# Patient Record
Sex: MALE | Race: WHITE | NOT HISPANIC OR LATINO | Employment: UNEMPLOYED | URBAN - METROPOLITAN AREA
[De-identification: names, ages, dates, MRNs, and addresses within clinical notes are randomized per-mention and may not be internally consistent; named-entity substitution may affect disease eponyms.]

---

## 2020-07-08 PROBLEM — G40.909 SEIZURE DISORDER: Status: ACTIVE | Noted: 2020-07-08

## 2020-07-08 PROBLEM — F32.A DEPRESSION WITH SUICIDAL IDEATION: Status: ACTIVE | Noted: 2020-07-08

## 2020-07-08 PROBLEM — R45.851 DEPRESSION WITH SUICIDAL IDEATION: Status: ACTIVE | Noted: 2020-07-08

## 2020-07-08 PROBLEM — E78.5 HYPERLIPIDEMIA: Status: ACTIVE | Noted: 2020-07-08

## 2020-07-08 PROBLEM — E78.49 OTHER HYPERLIPIDEMIA: Status: ACTIVE | Noted: 2020-07-08

## 2020-07-08 PROBLEM — E87.6 HYPOKALEMIA: Status: ACTIVE | Noted: 2020-07-08

## 2020-07-08 PROBLEM — S06.6XAA SUBARACHNOID HEMATOMA: Status: ACTIVE | Noted: 2018-05-16

## 2020-07-08 PROBLEM — F31.5 BIPOLAR I DISORDER, SEVERE, CURRENT OR MOST RECENT EPISODE DEPRESSED, WITH PSYCHOTIC FEATURES: Status: ACTIVE | Noted: 2020-07-08

## 2020-07-08 PROBLEM — F32.A DEPRESSION: Status: ACTIVE | Noted: 2020-07-08

## 2020-07-08 PROBLEM — F15.222 AMPHETAMINE INTOXICATION WITH PERCEPTUAL DISTURBANCE AND MODERATE TO SEVERE USE DISORDER: Status: ACTIVE | Noted: 2020-07-08

## 2020-07-09 PROBLEM — I10 HYPERTENSION: Chronic | Status: ACTIVE | Noted: 2020-07-09

## 2020-07-09 PROBLEM — R19.7 ACUTE DIARRHEA: Status: ACTIVE | Noted: 2020-07-09

## 2020-07-10 PROBLEM — Z76.89 NEED FOR OCCUPATIONAL THERAPY ASSESSMENT: Status: ACTIVE | Noted: 2020-07-10

## 2020-07-10 PROBLEM — Z01.89 PHYSICAL THERAPY EVALUATION, INITIAL: Status: ACTIVE | Noted: 2020-07-10

## 2020-07-14 PROBLEM — I69.954 HEMIPLEGIA OF LEFT NONDOMINANT SIDE AS LATE EFFECT OF CEREBROVASCULAR DISEASE: Chronic | Status: ACTIVE | Noted: 2020-07-14

## 2020-07-14 PROBLEM — I69.954 HEMIPLEGIA OF LEFT NONDOMINANT SIDE AS LATE EFFECT OF CEREBROVASCULAR DISEASE: Status: ACTIVE | Noted: 2020-07-14

## 2020-07-15 PROBLEM — L85.3 DRY SKIN: Status: ACTIVE | Noted: 2020-07-15

## 2020-07-16 PROBLEM — F32.A DEPRESSION: Status: RESOLVED | Noted: 2020-07-08 | Resolved: 2020-07-16

## 2020-07-16 PROBLEM — L85.3 DRY SKIN: Status: RESOLVED | Noted: 2020-07-15 | Resolved: 2020-07-16

## 2020-07-16 PROBLEM — R19.7 ACUTE DIARRHEA: Status: RESOLVED | Noted: 2020-07-09 | Resolved: 2020-07-16

## 2020-07-16 PROBLEM — F15.222 AMPHETAMINE INTOXICATION WITH PERCEPTUAL DISTURBANCE AND MODERATE TO SEVERE USE DISORDER: Status: RESOLVED | Noted: 2020-07-08 | Resolved: 2020-07-16

## 2020-07-16 PROBLEM — E87.6 HYPOKALEMIA: Status: RESOLVED | Noted: 2020-07-08 | Resolved: 2020-07-16

## 2022-10-11 PROBLEM — Z12.11 ENCOUNTER FOR SCREENING COLONOSCOPY: Status: ACTIVE | Noted: 2022-10-11

## 2023-04-11 ENCOUNTER — HOSPITAL ENCOUNTER (EMERGENCY)
Facility: HOSPITAL | Age: 53
Discharge: HOME | End: 2023-04-11
Attending: EMERGENCY MEDICINE
Payer: MEDICAID

## 2023-04-11 VITALS
OXYGEN SATURATION: 100 % | HEIGHT: 74 IN | WEIGHT: 160 LBS | HEART RATE: 50 BPM | RESPIRATION RATE: 16 BRPM | SYSTOLIC BLOOD PRESSURE: 121 MMHG | DIASTOLIC BLOOD PRESSURE: 89 MMHG | BODY MASS INDEX: 20.53 KG/M2 | TEMPERATURE: 98 F

## 2023-04-11 DIAGNOSIS — G43.909 MIGRAINE WITHOUT STATUS MIGRAINOSUS, NOT INTRACTABLE, UNSPECIFIED MIGRAINE TYPE: Primary | ICD-10-CM

## 2023-04-11 LAB
CREAT SERPL-MCNC: 1 MG/DL (ref 0.5–1.4)
SAMPLE: NORMAL

## 2023-04-11 PROCEDURE — 96375 TX/PRO/DX INJ NEW DRUG ADDON: CPT

## 2023-04-11 PROCEDURE — 99285 EMERGENCY DEPT VISIT HI MDM: CPT | Mod: 25

## 2023-04-11 PROCEDURE — 63600175 PHARM REV CODE 636 W HCPCS: Performed by: NURSE PRACTITIONER

## 2023-04-11 PROCEDURE — 96374 THER/PROPH/DIAG INJ IV PUSH: CPT

## 2023-04-11 PROCEDURE — 96361 HYDRATE IV INFUSION ADD-ON: CPT

## 2023-04-11 PROCEDURE — 25000003 PHARM REV CODE 250: Performed by: NURSE PRACTITIONER

## 2023-04-11 RX ORDER — METHOCARBAMOL 500 MG/1
500 TABLET, FILM COATED ORAL
Status: COMPLETED | OUTPATIENT
Start: 2023-04-11 | End: 2023-04-11

## 2023-04-11 RX ORDER — DIPHENHYDRAMINE HCL 12.5MG/5ML
6.25 ELIXIR ORAL
Status: COMPLETED | OUTPATIENT
Start: 2023-04-11 | End: 2023-04-11

## 2023-04-11 RX ORDER — PROCHLORPERAZINE EDISYLATE 5 MG/ML
10 INJECTION INTRAMUSCULAR; INTRAVENOUS
Status: COMPLETED | OUTPATIENT
Start: 2023-04-11 | End: 2023-04-11

## 2023-04-11 RX ORDER — DICLOFENAC SODIUM 50 MG/1
50 TABLET, DELAYED RELEASE ORAL 3 TIMES DAILY PRN
Qty: 20 TABLET | Refills: 2 | Status: SHIPPED | OUTPATIENT
Start: 2023-04-11

## 2023-04-11 RX ORDER — KETOROLAC TROMETHAMINE 30 MG/ML
15 INJECTION, SOLUTION INTRAMUSCULAR; INTRAVENOUS
Status: COMPLETED | OUTPATIENT
Start: 2023-04-11 | End: 2023-04-11

## 2023-04-11 RX ADMIN — DIPHENHYDRAMINE HYDROCHLORIDE 6.25 MG: 25 SOLUTION ORAL at 01:04

## 2023-04-11 RX ADMIN — SODIUM CHLORIDE 1000 ML: 0.9 INJECTION, SOLUTION INTRAVENOUS at 01:04

## 2023-04-11 RX ADMIN — PROCHLORPERAZINE EDISYLATE 10 MG: 5 INJECTION INTRAMUSCULAR; INTRAVENOUS at 01:04

## 2023-04-11 RX ADMIN — METHOCARBAMOL 500 MG: 500 TABLET ORAL at 03:04

## 2023-04-11 RX ADMIN — KETOROLAC TROMETHAMINE 15 MG: 30 INJECTION, SOLUTION INTRAMUSCULAR; INTRAVENOUS at 01:04

## 2023-04-11 NOTE — ED PROVIDER NOTES
Encounter Date: 2023       History     Chief Complaint   Patient presents with    Headache     Pt states chronic migraines. Onset this episode x 2.5 weeks.     Presents with complaint of headache.  Patient reports history of migraine headaches.  Also reports a history of an aneurysm in .  He has a history of PTSD.  Is currently residing at [facility].  He has an opiate addiction.  He moved here from she reports to go to [facility].  He denies blurred vision or dizziness.  He also reports neck pain.  His neck pain is chronic.  Denies injury.  He has been on Neurontin in the past for his neck pain but has not had any since September of last year.  Has not had any of his medications including his psych medications for bipolar.  Denies fever nausea vomiting or diarrhea.    Review of patient's allergies indicates:  No Known Allergies  Past Medical History:   Diagnosis Date    Aneurysm     Anxiety     Asthma     Bipolar 1 disorder     Coronary artery disease involving coronary bypass graft     Depression     Hyperlipidemia 2020    Hypertension     Kidney stone     PTSD (post-traumatic stress disorder)     Stroke      Past Surgical History:   Procedure Laterality Date    BYPASS OF MESENTERIC ARTERY      CORONARY ARTERY BYPASS GRAFT  2018    2v    FRACTURE SURGERY      REPAIR OF ANEURYSM       Family History   Problem Relation Age of Onset    Cancer Father      Social History     Tobacco Use    Smoking status: Former     Packs/day: 1.00     Years: 34.00     Pack years: 34.00     Types: Cigarettes     Quit date: 11/3/2021     Years since quittin.4    Smokeless tobacco: Never   Substance Use Topics    Alcohol use: Not Currently    Drug use: Not Currently     Review of Systems   Constitutional:  Negative for chills, diaphoresis and fever.   Respiratory:  Negative for cough, chest tightness, shortness of breath and wheezing.    Cardiovascular:  Negative for chest pain, palpitations and  leg swelling.   Gastrointestinal:  Negative for abdominal distention, abdominal pain, diarrhea, nausea and vomiting.   Musculoskeletal:  Positive for neck pain. Negative for back pain and gait problem.   Skin:  Negative for rash.   Neurological:  Positive for headaches. Negative for dizziness, weakness and light-headedness.   Psychiatric/Behavioral:  Negative for agitation and behavioral problems.      Physical Exam     Initial Vitals [04/11/23 1142]   BP Pulse Resp Temp SpO2   (!) 147/86 85 17 98.2 °F (36.8 °C) 97 %      MAP       --         Physical Exam    Constitutional: He appears well-developed and well-nourished.   HENT:   Head: Normocephalic and atraumatic.   Mouth/Throat: Oropharynx is clear and moist.   Eyes: Conjunctivae are normal. Pupils are equal, round, and reactive to light.   Neck: Neck supple.   There is no bony tenderness.  Patient has full range of motion to his cervical spine.   Normal range of motion.  Cardiovascular:  Normal rate, regular rhythm and normal heart sounds.           Pulmonary/Chest: No respiratory distress. He has no wheezes. He has no rhonchi.   Musculoskeletal:      Cervical back: Normal range of motion and neck supple.      Comments: Patient has left-sided weakness to that upper arm it is somewhat contracted.  This is from an old CVA.  He is ambulatory per self.  Moves all other extremities without difficulty.     Neurological: He is alert and oriented to person, place, and time. He has normal strength. No sensory deficit. GCS score is 15. GCS eye subscore is 4. GCS verbal subscore is 5. GCS motor subscore is 6.   Skin: Capillary refill takes less than 2 seconds.   Psychiatric: He has a normal mood and affect. Thought content normal.       ED Course   Procedures  Labs Reviewed   ISTAT CREATININE   POCT CREATININE          Imaging Results              CT Head Without Contrast (Final result)  Result time 04/11/23 12:32:42      Final result by Zach Borrego MD (04/11/23 12:32:42)                    Impression:      1. No acute intracranial abnormality.  2. Unchanged postsurgical changes of right pterional craniotomy and aneurysm clipping in region of right sylvian fissure.  3. Unchanged right frontotemporal lobe encephalomalacia.      Electronically signed by: Zach Borrego MD  Date:    04/11/2023  Time:    12:32               Narrative:      CMS MANDATED QUALITY DATA - CT RADIATION - 436    All CT scans at this facility utilize dose modulation, iterative reconstruction, and/or weight based dosing when appropriate to reduce radiation dose to as low as reasonably achievable.    EXAMINATION:  CT HEAD WITHOUT CONTRAST    CLINICAL HISTORY:  Headache, new or worsening (Age >= 50y);History of a brain aneurysm rupture;    TECHNIQUE:  Head CT without IV contrast.    COMPARISON:  01/13/2022    FINDINGS:  Gray-white differentiation is maintained without hemorrhage, midline shift, or mass effect.    Right pterional craniotomy is present along with 2 aneurysm clips in expected location of the right sylvian fissure.  Right frontal lobe, temporal lobe, insular/extreme capsule and putamen encephalomalacia has not significantly changed.  Focal calcification right frontal lobe unchanged.  Ex vacuo dilation of body of right lateral ventricle is unchanged, also affect in the temporal horn.    The ventricles and cisterns are maintained.    No new calvarial abnormality.  Trace mucosal thickening inferiorly in bilateral maxillary sinuses noted.                                       Medications   sodium chloride 0.9% bolus 1,000 mL 1,000 mL (0 mLs Intravenous Stopped 4/11/23 1434)   ketorolac injection 15 mg (15 mg Intravenous Given 4/11/23 1331)   diphenhydrAMINE 12.5 mg/5 mL elixir 6.25 mg (6.25 mg Oral Given 4/11/23 1321)   prochlorperazine injection Soln 10 mg (10 mg Intravenous Given 4/11/23 1337)   methocarbamoL tablet 500 mg (500 mg Oral Given 4/11/23 1508)     Medical Decision Making:   Initial Assessment:    Presents with complaint of headache.  Denies dizziness or blurred vision.  Patient has a history of migraine headaches.  Patient also has a history of a brain aneurysm.  He also has PTSD.  He has a history of a stroke.  Patient is currently residing at [facility].  He moved from report here after getting a recommendation for rehab from narcotic addiction at the [facility].  Clinical Tests:   Radiological Study: Reviewed  ED Management:  CT of the head reveals nothing acute.  Patient was given a L of normal saline.  Was given Toradol Compazine and Benadryl.  This has totally relieved his headache.  Have given him a Robaxin here while in the ED. this has for the muscle pain in his neck.  He was discharged back to [facility].  Patient has not had any of his medications filled in quite some time.  He has none of them here.  I have given him [facility] is phone number and address.  He is to follow up with them.  At no time while in the ED did this patient appear to be in any acute distress.  At discharge was given return precautions.  He is ambulatory per self.  I have discussed this patient with                         Clinical Impression:   Final diagnoses:  [G43.909] Migraine without status migrainosus, not intractable, unspecified migraine type (Primary)        ED Disposition Condition    Discharge Stable          ED Prescriptions       Medication Sig Dispense Start Date End Date Auth. Provider    diclofenac (VOLTAREN) 50 MG EC tablet Take 1 tablet (50 mg total) by mouth 3 (three) times daily as needed. 20 tablet 4/11/2023 -- Kera Gilils NP          Follow-up Information       Follow up With Specialties Details Why Contact [facility]  In 2 days  [address] [id number]  [phone]               Kera Gillis NP  04/11/23 4990

## 2023-04-11 NOTE — DISCHARGE INSTRUCTIONS
Make a follow-up appointment with [facility].  This is very important.  You need to have your medications refilled and start taking them as directed.  Return to the ED for any worsening of symptoms or any other concerns.

## 2023-05-31 DIAGNOSIS — I63.9 CEREBROVASCULAR ACCIDENT (CVA), UNSPECIFIED MECHANISM: Primary | ICD-10-CM

## 2023-06-21 ENCOUNTER — HOSPITAL ENCOUNTER (OUTPATIENT)
Dept: RADIOLOGY | Facility: HOSPITAL | Age: 53
Discharge: HOME | End: 2023-06-21
Attending: STUDENT IN AN ORGANIZED HEALTH CARE EDUCATION/TRAINING PROGRAM
Payer: MEDICAID

## 2023-06-21 DIAGNOSIS — I63.9 CEREBROVASCULAR ACCIDENT (CVA), UNSPECIFIED MECHANISM: ICD-10-CM

## 2023-06-21 DIAGNOSIS — I63.9 CVA (CEREBRAL VASCULAR ACCIDENT): Primary | ICD-10-CM

## 2023-06-21 PROCEDURE — 70496 CTA HEAD AND NECK (XPD): ICD-10-PCS | Mod: 26,,, | Performed by: RADIOLOGY

## 2023-06-21 PROCEDURE — 70496 CT ANGIOGRAPHY HEAD: CPT | Mod: 26,,, | Performed by: RADIOLOGY

## 2023-06-21 PROCEDURE — 70496 CT ANGIOGRAPHY HEAD: CPT | Mod: TC

## 2023-06-21 PROCEDURE — 25500020 PHARM REV CODE 255

## 2023-06-21 PROCEDURE — 70498 CTA HEAD AND NECK (XPD): ICD-10-PCS | Mod: 26,,, | Performed by: RADIOLOGY

## 2023-06-21 PROCEDURE — 70498 CT ANGIOGRAPHY NECK: CPT | Mod: 26,,, | Performed by: RADIOLOGY

## 2023-06-21 RX ADMIN — IOHEXOL 75 ML: 350 INJECTION, SOLUTION INTRAVENOUS at 09:06

## 2023-06-22 LAB
CREAT SERPL-MCNC: 1 MG/DL (ref 0.5–1.4)
SAMPLE: NORMAL

## 2023-07-20 ENCOUNTER — HOSPITAL ENCOUNTER (OUTPATIENT)
Dept: CARDIOLOGY | Facility: HOSPITAL | Age: 53
Discharge: HOME | End: 2023-07-20
Attending: STUDENT IN AN ORGANIZED HEALTH CARE EDUCATION/TRAINING PROGRAM
Payer: MEDICAID

## 2023-07-20 VITALS — WEIGHT: 160 LBS | BODY MASS INDEX: 20.53 KG/M2 | HEIGHT: 74 IN

## 2023-07-20 DIAGNOSIS — I63.9 CEREBROVASCULAR ACCIDENT (CVA), UNSPECIFIED MECHANISM: ICD-10-CM

## 2023-07-20 LAB
AORTIC ROOT ANNULUS: 3.7 CM
AORTIC VALVE CUSP SEPERATION: 2 CM
AV INDEX (PROSTH): 0.86
AV MEAN GRADIENT: 4 MMHG
AV PEAK GRADIENT: 7 MMHG
AV REGURGITATION PRESSURE HALF TIME: 460 MS
AV VALVE AREA: 3.26 CM2
AV VELOCITY RATIO: 0.72
BSA FOR ECHO PROCEDURE: 1.95 M2
CV ECHO LV RWT: 0.82 CM
DOP CALC AO PEAK VEL: 1.32 M/S
DOP CALC AO VTI: 22.5 CM
DOP CALC LVOT AREA: 3.8 CM2
DOP CALC LVOT DIAMETER: 2.2 CM
DOP CALC LVOT PEAK VEL: 0.95 M/S
DOP CALC LVOT STROKE VOLUME: 73.33 CM3
DOP CALCLVOT PEAK VEL VTI: 19.3 CM
E WAVE DECELERATION TIME: 174 MSEC
E/A RATIO: 0.81
E/E' RATIO: 6.42 M/S
ECHO LV POSTERIOR WALL: 1.42 CM (ref 0.6–1.1)
EJECTION FRACTION: 60 %
FRACTIONAL SHORTENING: 31 % (ref 28–44)
INTERVENTRICULAR SEPTUM: 1.23 CM (ref 0.6–1.1)
IVRT: 95 MSEC
LEFT ATRIUM SIZE: 2.9 CM
LEFT INTERNAL DIMENSION IN SYSTOLE: 2.37 CM (ref 2.1–4)
LEFT VENTRICLE DIASTOLIC VOLUME INDEX: 24.8 ML/M2
LEFT VENTRICLE DIASTOLIC VOLUME: 49.1 ML
LEFT VENTRICLE MASS INDEX: 78 G/M2
LEFT VENTRICLE SYSTOLIC VOLUME INDEX: 9.8 ML/M2
LEFT VENTRICLE SYSTOLIC VOLUME: 19.5 ML
LEFT VENTRICULAR INTERNAL DIMENSION IN DIASTOLE: 3.45 CM (ref 3.5–6)
LEFT VENTRICULAR MASS: 155.28 G
LV LATERAL E/E' RATIO: 6.1 M/S
LV SEPTAL E/E' RATIO: 6.78 M/S
LVOT MG: 2 MMHG
LVOT MV: 0.65 CM/S
MV PEAK A VEL: 0.75 M/S
MV PEAK E VEL: 0.61 M/S
MV STENOSIS PRESSURE HALF TIME: 49 MS
MV VALVE AREA P 1/2 METHOD: 4.49 CM2
PISA AR MAX VEL: 2.26 M/S
PISA TR MAX VEL: 2.48 M/S
RA PRESSURE: 3 MMHG
RIGHT VENTRICULAR END-DIASTOLIC DIMENSION: 2.38 CM
TDI LATERAL: 0.1 M/S
TDI SEPTAL: 0.09 M/S
TDI: 0.1 M/S
TR MAX PG: 25 MMHG
TV REST PULMONARY ARTERY PRESSURE: 28 MMHG

## 2023-07-20 PROCEDURE — 93306 TTE W/DOPPLER COMPLETE: CPT | Mod: 26,,, | Performed by: INTERNAL MEDICINE

## 2023-07-20 PROCEDURE — 93306 TTE W/DOPPLER COMPLETE: CPT

## 2023-07-20 PROCEDURE — 93306 ECHO (CUPID ONLY): ICD-10-PCS | Mod: 26,,, | Performed by: INTERNAL MEDICINE

## 2024-01-05 ENCOUNTER — TELEPHONE (OUTPATIENT)
Dept: ADMINISTRATIVE | Facility: HOSPITAL | Age: 54
End: 2024-01-05
Payer: MEDICAID

## 2024-01-05 DIAGNOSIS — Z76.0 MEDICATION REFILL: ICD-10-CM

## 2024-03-14 ENCOUNTER — LAB VISIT (OUTPATIENT)
Dept: LAB | Facility: HOSPITAL | Age: 54
End: 2024-03-14
Attending: NURSE PRACTITIONER
Payer: MEDICAID

## 2024-03-14 ENCOUNTER — TELEPHONE (OUTPATIENT)
Dept: INTERNAL MEDICINE | Facility: CLINIC | Age: 54
End: 2024-03-14
Payer: MEDICAID

## 2024-03-14 ENCOUNTER — OFFICE VISIT (OUTPATIENT)
Dept: INTERNAL MEDICINE | Facility: CLINIC | Age: 54
End: 2024-03-14
Payer: MEDICAID

## 2024-03-14 VITALS
DIASTOLIC BLOOD PRESSURE: 89 MMHG | HEIGHT: 74 IN | SYSTOLIC BLOOD PRESSURE: 135 MMHG | BODY MASS INDEX: 20.53 KG/M2 | TEMPERATURE: 98 F | HEART RATE: 90 BPM | RESPIRATION RATE: 18 BRPM | WEIGHT: 160 LBS

## 2024-03-14 DIAGNOSIS — I25.810 CORONARY ARTERY DISEASE INVOLVING CORONARY BYPASS GRAFT OF NATIVE HEART WITHOUT ANGINA PECTORIS: ICD-10-CM

## 2024-03-14 DIAGNOSIS — Z11.3 SCREEN FOR STD (SEXUALLY TRANSMITTED DISEASE): ICD-10-CM

## 2024-03-14 DIAGNOSIS — F31.5 BIPOLAR I DISORDER, SEVERE, CURRENT OR MOST RECENT EPISODE DEPRESSED, WITH PSYCHOTIC FEATURES: ICD-10-CM

## 2024-03-14 DIAGNOSIS — R39.198 DIFFICULTY IN URINATION: ICD-10-CM

## 2024-03-14 DIAGNOSIS — I10 PRIMARY HYPERTENSION: Chronic | ICD-10-CM

## 2024-03-14 DIAGNOSIS — E78.49 OTHER HYPERLIPIDEMIA: ICD-10-CM

## 2024-03-14 DIAGNOSIS — Z86.79 HISTORY OF CEREBRAL ANEURYSM: ICD-10-CM

## 2024-03-14 DIAGNOSIS — R51.9 CHRONIC NONINTRACTABLE HEADACHE, UNSPECIFIED HEADACHE TYPE: ICD-10-CM

## 2024-03-14 DIAGNOSIS — G40.909 SEIZURE DISORDER: ICD-10-CM

## 2024-03-14 DIAGNOSIS — G89.29 CHRONIC NONINTRACTABLE HEADACHE, UNSPECIFIED HEADACHE TYPE: ICD-10-CM

## 2024-03-14 DIAGNOSIS — Z76.0 MEDICATION REFILL: ICD-10-CM

## 2024-03-14 DIAGNOSIS — Z00.00 WELLNESS EXAMINATION: ICD-10-CM

## 2024-03-14 DIAGNOSIS — I69.954 SPASTIC HEMIPLEGIA OF LEFT NONDOMINANT SIDE AS LATE EFFECT OF CEREBROVASCULAR DISEASE, UNSPECIFIED CEREBROVASCULAR DISEASE TYPE: Chronic | ICD-10-CM

## 2024-03-14 LAB
ALBUMIN SERPL-MCNC: 4.4 G/DL (ref 3.5–5)
ALBUMIN/GLOB SERPL: 1.4 RATIO (ref 1.1–2)
ALP SERPL-CCNC: 100 UNIT/L (ref 40–150)
ALT SERPL-CCNC: 14 UNIT/L (ref 0–55)
AMPHET UR QL SCN: NEGATIVE
APPEARANCE UR: CLEAR
AST SERPL-CCNC: 12 UNIT/L (ref 5–34)
BACTERIA #/AREA URNS AUTO: ABNORMAL /HPF
BARBITURATE SCN PRESENT UR: NEGATIVE
BASOPHILS # BLD AUTO: 0.05 X10(3)/MCL
BASOPHILS NFR BLD AUTO: 0.7 %
BENZODIAZ UR QL SCN: NEGATIVE
BILIRUB SERPL-MCNC: 0.9 MG/DL
BILIRUB UR QL STRIP.AUTO: NEGATIVE
BUN SERPL-MCNC: 8.9 MG/DL (ref 8.4–25.7)
CALCIUM SERPL-MCNC: 9.6 MG/DL (ref 8.4–10.2)
CANNABINOIDS UR QL SCN: NEGATIVE
CHLORIDE SERPL-SCNC: 109 MMOL/L (ref 98–107)
CHOLEST SERPL-MCNC: 140 MG/DL
CHOLEST/HDLC SERPL: 3 {RATIO} (ref 0–5)
CO2 SERPL-SCNC: 25 MMOL/L (ref 22–29)
COCAINE UR QL SCN: NEGATIVE
COLOR UR AUTO: ABNORMAL
CREAT SERPL-MCNC: 0.9 MG/DL (ref 0.73–1.18)
DEPRECATED CALCIDIOL+CALCIFEROL SERPL-MC: 27.6 NG/ML (ref 30–80)
EOSINOPHIL # BLD AUTO: 0.23 X10(3)/MCL (ref 0–0.9)
EOSINOPHIL NFR BLD AUTO: 3.3 %
ERYTHROCYTE [DISTWIDTH] IN BLOOD BY AUTOMATED COUNT: 12.1 % (ref 11.5–17)
EST. AVERAGE GLUCOSE BLD GHB EST-MCNC: 93.9 MG/DL
FENTANYL UR QL SCN: NEGATIVE
GFR SERPLBLD CREATININE-BSD FMLA CKD-EPI: >60 MLS/MIN/1.73/M2
GLOBULIN SER-MCNC: 3.2 GM/DL (ref 2.4–3.5)
GLUCOSE SERPL-MCNC: 84 MG/DL (ref 74–100)
GLUCOSE UR QL STRIP.AUTO: NORMAL
HAV IGM SERPL QL IA: NONREACTIVE
HBA1C MFR BLD: 4.9 %
HBV CORE IGM SERPL QL IA: NONREACTIVE
HBV SURFACE AG SERPL QL IA: NONREACTIVE
HCT VFR BLD AUTO: 47.4 % (ref 42–52)
HCV AB SERPL QL IA: NONREACTIVE
HDLC SERPL-MCNC: 43 MG/DL (ref 35–60)
HGB BLD-MCNC: 16.1 G/DL (ref 14–18)
HIV 1+2 AB+HIV1 P24 AG SERPL QL IA: NONREACTIVE
HYALINE CASTS #/AREA URNS LPF: ABNORMAL /LPF
IMM GRANULOCYTES # BLD AUTO: 0.02 X10(3)/MCL (ref 0–0.04)
IMM GRANULOCYTES NFR BLD AUTO: 0.3 %
KETONES UR QL STRIP.AUTO: NEGATIVE
LDLC SERPL CALC-MCNC: 76 MG/DL (ref 50–140)
LEUKOCYTE ESTERASE UR QL STRIP.AUTO: NEGATIVE
LYMPHOCYTES # BLD AUTO: 1.05 X10(3)/MCL (ref 0.6–4.6)
LYMPHOCYTES NFR BLD AUTO: 15.3 %
MCH RBC QN AUTO: 31.9 PG (ref 27–31)
MCHC RBC AUTO-ENTMCNC: 34 G/DL (ref 33–36)
MCV RBC AUTO: 94 FL (ref 80–94)
MDMA UR QL SCN: NEGATIVE
MONOCYTES # BLD AUTO: 0.47 X10(3)/MCL (ref 0.1–1.3)
MONOCYTES NFR BLD AUTO: 6.8 %
MUCOUS THREADS URNS QL MICRO: ABNORMAL /LPF
NEUTROPHILS # BLD AUTO: 5.05 X10(3)/MCL (ref 2.1–9.2)
NEUTROPHILS NFR BLD AUTO: 73.6 %
NITRITE UR QL STRIP.AUTO: NEGATIVE
NRBC BLD AUTO-RTO: 0 %
OPIATES UR QL SCN: NEGATIVE
PCP UR QL: NEGATIVE
PH UR STRIP.AUTO: 6 [PH]
PH UR: 6 [PH] (ref 3–11)
PLATELET # BLD AUTO: 314 X10(3)/MCL (ref 130–400)
PMV BLD AUTO: 9.1 FL (ref 7.4–10.4)
POTASSIUM SERPL-SCNC: 4 MMOL/L (ref 3.5–5.1)
PROT SERPL-MCNC: 7.6 GM/DL (ref 6.4–8.3)
PROT UR QL STRIP.AUTO: NEGATIVE
PSA SERPL-MCNC: 0.83 NG/ML
RBC # BLD AUTO: 5.04 X10(6)/MCL (ref 4.7–6.1)
RBC #/AREA URNS AUTO: ABNORMAL /HPF
RBC UR QL AUTO: NEGATIVE
SODIUM SERPL-SCNC: 141 MMOL/L (ref 136–145)
SP GR UR STRIP.AUTO: 1.01 (ref 1–1.03)
SPECIFIC GRAVITY, URINE AUTO (.000) (OHS): 1.01 (ref 1–1.03)
SQUAMOUS #/AREA URNS LPF: ABNORMAL /HPF
T PALLIDUM AB SER QL: NONREACTIVE
TRIGL SERPL-MCNC: 107 MG/DL (ref 34–140)
TSH SERPL-ACNC: 1.06 UIU/ML (ref 0.35–4.94)
UROBILINOGEN UR STRIP-ACNC: NORMAL
VLDLC SERPL CALC-MCNC: 21 MG/DL
WBC # SPEC AUTO: 6.87 X10(3)/MCL (ref 4.5–11.5)
WBC #/AREA URNS AUTO: ABNORMAL /HPF

## 2024-03-14 PROCEDURE — 36415 COLL VENOUS BLD VENIPUNCTURE: CPT

## 2024-03-14 PROCEDURE — 3008F BODY MASS INDEX DOCD: CPT | Mod: CPTII,,, | Performed by: NURSE PRACTITIONER

## 2024-03-14 PROCEDURE — 3075F SYST BP GE 130 - 139MM HG: CPT | Mod: CPTII,,, | Performed by: NURSE PRACTITIONER

## 2024-03-14 PROCEDURE — 3044F HG A1C LEVEL LT 7.0%: CPT | Mod: CPTII,,, | Performed by: NURSE PRACTITIONER

## 2024-03-14 PROCEDURE — 86780 TREPONEMA PALLIDUM: CPT

## 2024-03-14 PROCEDURE — 4010F ACE/ARB THERAPY RXD/TAKEN: CPT | Mod: CPTII,,, | Performed by: NURSE PRACTITIONER

## 2024-03-14 PROCEDURE — 87389 HIV-1 AG W/HIV-1&-2 AB AG IA: CPT

## 2024-03-14 PROCEDURE — 1159F MED LIST DOCD IN RCRD: CPT | Mod: CPTII,,, | Performed by: NURSE PRACTITIONER

## 2024-03-14 PROCEDURE — 1160F RVW MEDS BY RX/DR IN RCRD: CPT | Mod: CPTII,,, | Performed by: NURSE PRACTITIONER

## 2024-03-14 PROCEDURE — 84443 ASSAY THYROID STIM HORMONE: CPT

## 2024-03-14 PROCEDURE — 99204 OFFICE O/P NEW MOD 45 MIN: CPT | Mod: S$PBB,,, | Performed by: NURSE PRACTITIONER

## 2024-03-14 PROCEDURE — 99215 OFFICE O/P EST HI 40 MIN: CPT | Mod: PBBFAC | Performed by: NURSE PRACTITIONER

## 2024-03-14 PROCEDURE — 85025 COMPLETE CBC W/AUTO DIFF WBC: CPT

## 2024-03-14 PROCEDURE — 81015 MICROSCOPIC EXAM OF URINE: CPT

## 2024-03-14 PROCEDURE — 3079F DIAST BP 80-89 MM HG: CPT | Mod: CPTII,,, | Performed by: NURSE PRACTITIONER

## 2024-03-14 PROCEDURE — 82306 VITAMIN D 25 HYDROXY: CPT

## 2024-03-14 PROCEDURE — 83036 HEMOGLOBIN GLYCOSYLATED A1C: CPT

## 2024-03-14 PROCEDURE — 80074 ACUTE HEPATITIS PANEL: CPT

## 2024-03-14 PROCEDURE — 84153 ASSAY OF PSA TOTAL: CPT

## 2024-03-14 PROCEDURE — 80307 DRUG TEST PRSMV CHEM ANLYZR: CPT

## 2024-03-14 PROCEDURE — 80061 LIPID PANEL: CPT

## 2024-03-14 PROCEDURE — 80053 COMPREHEN METABOLIC PANEL: CPT

## 2024-03-14 RX ORDER — TAMSULOSIN HYDROCHLORIDE 0.4 MG/1
1 CAPSULE ORAL DAILY
Qty: 30 CAPSULE | Refills: 3 | Status: SHIPPED | OUTPATIENT
Start: 2024-03-14 | End: 2024-04-16

## 2024-03-14 RX ORDER — LISINOPRIL 2.5 MG/1
1 TABLET ORAL DAILY
COMMUNITY
Start: 2023-09-16 | End: 2024-04-16

## 2024-03-14 RX ORDER — PANTOPRAZOLE SODIUM 40 MG/1
40 TABLET, DELAYED RELEASE ORAL DAILY
Qty: 30 TABLET | Refills: 2 | Status: SHIPPED | OUTPATIENT
Start: 2024-03-14 | End: 2024-06-03
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
## 2024-03-15 NOTE — TELEPHONE ENCOUNTER
Called and spoke to Susie at Bonilla ARELLANO MD 's office in [place] . She provided me with fax number  [phone]. Faxed request for pt's medical records and received successful confirmation.Scanned successful confirmation into media.

## 2024-03-26 ENCOUNTER — OFFICE VISIT (OUTPATIENT)
Dept: CARDIOLOGY | Facility: CLINIC | Age: 54
End: 2024-03-26
Payer: MEDICAID

## 2024-03-26 ENCOUNTER — PROCEDURE VISIT (OUTPATIENT)
Dept: NEUROLOGY | Facility: HOSPITAL | Age: 54
End: 2024-03-26
Attending: NURSE PRACTITIONER
Payer: MEDICAID

## 2024-03-26 VITALS
SYSTOLIC BLOOD PRESSURE: 128 MMHG | OXYGEN SATURATION: 97 % | WEIGHT: 159.19 LBS | DIASTOLIC BLOOD PRESSURE: 86 MMHG | BODY MASS INDEX: 20.43 KG/M2 | RESPIRATION RATE: 20 BRPM | HEART RATE: 70 BPM | HEIGHT: 74 IN | TEMPERATURE: 99 F

## 2024-03-26 DIAGNOSIS — I10 PRIMARY HYPERTENSION: Chronic | ICD-10-CM

## 2024-03-26 DIAGNOSIS — E78.49 OTHER HYPERLIPIDEMIA: ICD-10-CM

## 2024-03-26 DIAGNOSIS — G40.909 SEIZURE DISORDER: ICD-10-CM

## 2024-03-26 DIAGNOSIS — Z76.0 MEDICATION REFILL: ICD-10-CM

## 2024-03-26 DIAGNOSIS — I25.810 CORONARY ARTERY DISEASE INVOLVING CORONARY BYPASS GRAFT OF NATIVE HEART WITHOUT ANGINA PECTORIS: Primary | ICD-10-CM

## 2024-03-26 DIAGNOSIS — I20.89 STABLE ANGINA PECTORIS: ICD-10-CM

## 2024-03-26 DIAGNOSIS — S06.6X9S: ICD-10-CM

## 2024-03-26 DIAGNOSIS — I69.954 SPASTIC HEMIPLEGIA OF LEFT NONDOMINANT SIDE AS LATE EFFECT OF CEREBROVASCULAR DISEASE, UNSPECIFIED CEREBROVASCULAR DISEASE TYPE: Chronic | ICD-10-CM

## 2024-03-26 PROCEDURE — 95816 EEG AWAKE AND DROWSY: CPT | Mod: 26,,, | Performed by: PSYCHIATRY & NEUROLOGY

## 2024-03-26 PROCEDURE — 95816 EEG AWAKE AND DROWSY: CPT

## 2024-03-26 PROCEDURE — 99215 OFFICE O/P EST HI 40 MIN: CPT | Mod: PBBFAC,25 | Performed by: INTERNAL MEDICINE

## 2024-03-26 RX ORDER — NAPROXEN SODIUM 220 MG/1
81 TABLET, FILM COATED ORAL DAILY
Qty: 90 TABLET | Refills: 3 | Status: SHIPPED | OUTPATIENT
Start: 2024-03-26

## 2024-03-26 RX ORDER — NITROGLYCERIN 0.4 MG/1
0.4 TABLET SUBLINGUAL EVERY 5 MIN PRN
Qty: 30 TABLET | Refills: 2 | Status: SHIPPED | OUTPATIENT
Start: 2024-03-26 | End: 2024-06-17 | Stop reason: SDUPTHER

## 2024-03-26 RX ORDER — METOPROLOL SUCCINATE 25 MG/1
25 TABLET, EXTENDED RELEASE ORAL DAILY
Qty: 90 TABLET | Refills: 3 | Status: SHIPPED | OUTPATIENT
Start: 2024-03-26

## 2024-03-26 RX ORDER — ATORVASTATIN CALCIUM 40 MG/1
40 TABLET, FILM COATED ORAL DAILY
Qty: 90 TABLET | Refills: 3 | Status: SHIPPED | OUTPATIENT
Start: 2024-03-26

## 2024-03-26 NOTE — PROGRESS NOTES
"ADDENDUM 4/16/2024  Records obtained from [facility] in [place], [place]. 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  So pt has NO history of CABG.      Cardiology attending addendum  Patient was seen and examined with Dallas Arroyo, MS4. Agree with plan as outlined below. Pt had a minimally invasive CABG x2 in 2018 in [place], hx of subarachnoid hemorrhage due to ruptured aneurysm s/p clipping who is here to establish cardiovascular care.  For the past 2-3 years he has been complaining of exertional chest pain when he "pushes himself".  He also reports chest pain that wakes him up from sleep at night but thinks this could be related to seizures he has not been taking any medications.  Will further evaluate his chest pain with a pharmacological nuclear stress test (patient with hemiplegia).  Will restart aspirin 81 mg, Lipitor 40 mg, metoprolol 25 mg and will prescribe him sublingual nitroglycerin.  Echocardiogram which I have personally reviewed reveals preserved EF and no significant abnormalities otherwise.    Karen Patterson MD  Cardiology staff-[facility]    "

## 2024-03-26 NOTE — PROGRESS NOTES
03/26/2024 8:23 AM    Subjective:     CHIEF COMPLAINT:   Chief Complaint   Patient presents with    initial visit has of CABGX2 2018 in [facility] denies chest                            HPI:    Mr. Marvin Leslie is a 53 y.o. male PMHx CAD (CABG x2 2018), CVA (2016), brain aneurysm (with clipping), HTN, HLD presents as referral for chest pain in the setting of known CAD. Patient reports that he feels overall ok, but each day is different due to his weakness and chest pains. He reports that for the past 2-3 years, he has had pressure-like substernal chest pain that occurs at rest and with exertion and typically lasts 5-10 minutes. This occurs nearly daily and is provoked by daily activities such as housework or walking >1mile. Patient's chest pain is associated with shortness of breath, palpitations, and lightheadedness as well. He does not take nitroglycerin. Patient is not taking BP meds or aspirin or plavix currently. Checks BP at home and will occasionally run in 140s SBP. Also reports mild LLE swelling.     Past Medical History    Patient Active Problem List   Diagnosis    Bipolar I disorder, severe, current or most recent episode depressed, with psychotic features    Seizure disorder    Subarachnoid hematoma    Coronary artery disease involving coronary bypass graft    Other hyperlipidemia    Hypertension    Physical therapy evaluation, initial    Need for occupational therapy assessment    Hemiplegia of left nondominant side as late effect of cerebrovascular disease    Encounter for screening colonoscopy       Surgical History    Past Surgical History:   Procedure Laterality Date    BYPASS OF MESENTERIC ARTERY      CORONARY ARTERY BYPASS GRAFT  12/24/2018    2v    FRACTURE SURGERY      REPAIR OF ANEURYSM      (2.)2016 and 2018       Social History    Social History     Socioeconomic History    Marital status:     Number of children: 0   Tobacco Use    Smoking status: Former     Current packs/day:  0.00     Average packs/day: 1 pack/day for 34.0 years (34.0 ttl pk-yrs)     Types: Cigarettes     Start date: 11/3/1987     Quit date: 11/3/2021     Years since quittin.3    Smokeless tobacco: Never   Substance and Sexual Activity    Alcohol use: Not Currently    Drug use: Not Currently     Comment: past     Social Determinants of Health     Financial Resource Strain: Patient Declined (3/14/2024)    Overall Financial Resource Strain (CARDIA)     Difficulty of Paying Living Expenses: Patient declined   Food Insecurity: Patient Declined (3/14/2024)    Hunger Vital Sign     Worried About Running Out of Food in the Last Year: Patient declined     Ran Out of Food in the Last Year: Patient declined   Transportation Needs: Patient Declined (3/14/2024)    PRAPARE - Transportation     Lack of Transportation (Medical): Patient declined     Lack of Transportation (Non-Medical): Patient declined   Physical Activity: Patient Declined (3/14/2024)    Exercise Vital Sign     Days of Exercise per Week: Patient declined     Minutes of Exercise per Session: Patient declined   Stress: Patient Declined (3/14/2024)    [employer] - Occupational Stress Questionnaire     Feeling of Stress : Patient declined   Social Connections: Patient Declined (3/14/2024)    Social Connection and Isolation Panel [NHANES]     Frequency of Communication with Friends and Family: Patient declined     Frequency of Social Gatherings with Friends and Family: Patient declined     Attends [employer] Services: Patient declined     Active Member of Clubs or Organizations: Patient declined     Attends Club or Organization Meetings: Patient declined     Marital Status: Patient declined   Housing Stability: Patient Declined (3/14/2024)    Housing Stability Vital Sign     Unable to Pay for Housing in the Last Year: Patient declined     Unstable Housing in the Last Year: Patient declined       Family History    Family History   Problem  Relation Age of Onset    Cancer Father        Allergy  Review of patient's allergies indicates:  No Known Allergies    Current Medications    Current Outpatient Medications:     pantoprazole (PROTONIX) 40 MG tablet, Take 1 tablet by mouth once daily, Disp: 30 tablet, Rfl: 2    tamsulosin (FLOMAX) 0.4 mg Cap, Take 1 capsule by mouth once daily., Disp: 30 capsule, Rfl: 3    albuterol (PROVENTIL HFA) 90 mcg/actuation inhaler, Inhale 2 puffs into the lungs every 6 (six) hours as needed for wheezing. Rescue, Disp: 8.5 g, Rfl: 14    ARIPiprazole (ABILIFY) 20 MG Tab, Take 1 tablet (20 mg total) by mouth once daily. (Patient not taking: Reported on 3/14/2024), Disp: 90 tablet, Rfl: 3    aspirin 81 MG Chew, Chew and swallow 1 tablet by mouth once daily. (Patient not taking: Reported on 3/14/2024), Disp: 90 tablet, Rfl: 3    atorvastatin (LIPITOR) 20 MG tablet, Take 1 tablet (20 mg total) by mouth once daily. (Patient not taking: Reported on 3/14/2024), Disp: 90 tablet, Rfl: 3    diclofenac (VOLTAREN) 50 MG EC tablet, Take 1 tablet (50 mg total) by mouth 3 (three) times daily as needed. (Patient not taking: Reported on 3/14/2024), Disp: 20 tablet, Rfl: 2    gabapentin (NEURONTIN) 300 MG capsule, Take 2 capsules by mouth 3 (three) times daily. (Patient not taking: Reported on 3/14/2024), Disp: 540 capsule, Rfl: 3    HYDROXYZINE HCL ORAL, Take by mouth., Disp: , Rfl:     hydrOXYzine pamoate (VISTARIL) 25 MG Cap, Take 1 capsule (25 mg total) by mouth 3 (three) times daily. (Patient not taking: Reported on 3/14/2024), Disp: 90 capsule, Rfl: 11    lisinopriL (PRINIVIL,ZESTRIL) 2.5 MG tablet, Take 1 tablet by mouth once daily., Disp: , Rfl:     methocarbamoL (ROBAXIN) 500 MG Tab, Take 1 tablet (500 mg total) by mouth 3 (three) times daily. (Patient not taking: Reported on 3/14/2024), Disp: 90 tablet, Rfl: 11    metoprolol succinate (TOPROL-XL) 25 MG 24 hr tablet, Take 1 tablet by mouth once daily. (Patient not taking: Reported on  "3/14/2024), Disp: 90 tablet, Rfl: 3    ondansetron (ZOFRAN) 4 MG tablet, Take 1 tablet (4 mg total) by mouth every 8 (eight) hours as needed. (Patient not taking: Reported on 3/14/2024), Disp: 30 tablet, Rfl: 11    prazosin (MINIPRESS) 1 MG Cap, Take 1 capsule (1 mg total) by mouth nightly. (Patient not taking: Reported on 3/14/2024), Disp: 90 capsule, Rfl: 3    sertraline (ZOLOFT) 100 MG tablet, Take 1 tablet by mouth once daily. (Patient not taking: Reported on 3/14/2024), Disp: 90 tablet, Rfl: 3    topiramate (TOPAMAX) 100 MG tablet, Take 1 tablet by mouth 2 (two) times daily., Disp: 180 tablet, Rfl: 3    traZODone (DESYREL) 100 MG tablet, Take 1 tablet (100 mg total) by mouth nightly. (Patient not taking: Reported on 3/14/2024), Disp: 90 tablet, Rfl: 3    VITAMIN D2 1,250 mcg (50,000 unit) capsule, , Disp: , Rfl:     ROS:   Please see HPI                                                                                                                                                                            CONSTITUTIONAL:    No fever.  No night sweats.  RESPIRATORY:  No cough.  No hemoptysis.  No wheezing.  SKIN:  No poor wound healing.  No rash.  CARDIOVASCULAR:  No claudication.  No cyanosis.     HEMATOLOGY:   No adenopathy.  No easy bruising.   MUSCULOSKELETAL:  No muscle cramp.  No muscle weakness.  GASTROENTEROLOGY:  No heart burn.  No melena.    NEUROLOGIC:  No dizziness.  No generalized weakness.     Objective:     PE:  Blood pressure 128/86, pulse 70, temperature 98.6 °F (37 °C), temperature source Oral, resp. rate 20, height 6' 2" (1.88 m), weight 72.2 kg (159 lb 3.2 oz), SpO2 97 %.   BP Readings from Last 3 Encounters:   03/26/24 128/86   03/14/24 135/89   04/11/23 121/89       Wt Readings from Last 3 Encounters:   03/26/24 72.2 kg (159 lb 3.2 oz)   03/14/24 72.6 kg (160 lb)   07/20/23 72.6 kg (160 lb)     BMI 20.44 kg/m^2    GENERAL:   NAD  CONSTITUTIONAL:  No acute distress.  Not ill " appearing.  NECK:  No cervical adenopathy.  No carotid bruit.  CARDIOVASCULAR:  Normal rate.  Regular rhythm.  No murmur.  PULMONARY:  No respiratory distress.  No wheezing.  No crackles.  ABDOMINAL:  No distention.  No tenderness.  MUSCULOSKELETAL:  No deformity.  No edema.  SKIN:  No bruising.  No rash.  NEUROLOGICAL:  Oriented x 3.  No weakness.                                                                                                                                                                                                                                                                                                                                                                                                                                                                               CARDIAC TESTING:  EKG  No results found for this or any previous visit.  Echocardiogram  Results for orders placed during the hospital encounter of 07/20/23    Echo    Interpretation Summary  · The left ventricle is normal in size with mild concentric hypertrophy and normal systolic function.  · Normal left ventricular diastolic function.  · The estimated ejection fraction is 60%.  · Normal right ventricular size with normal right ventricular systolic function.  · Normal central venous pressure (3 mmHg).  · The estimated PA systolic pressure is 28 mmHg.    Stress Test  No results found for this or any previous visit.     Coronary Angiogram  No results found for this or any previous visit.       Last BMP  BMP  Lab Results   Component Value Date     03/14/2024    K 4.0 03/14/2024     07/01/2021    CO2 25 03/14/2024    BUN 8.9 03/14/2024    CREATININE 0.90 03/14/2024    CALCIUM 9.6 03/14/2024    ANIONGAP 12 07/01/2021    EGFRNORACEVR >60 03/14/2024     Last CBC     Lab Results   Component Value Date    WBC 6.87 03/14/2024    HGB 16.1 03/14/2024    HCT 47.4 03/14/2024    MCV 94.0 03/14/2024      03/14/2024         Last lipids    Lab Results   Component Value Date    CHOL 140 03/14/2024    CHOL 78 08/12/2023    CHOL 98 (L) 03/29/2021     Lab Results   Component Value Date    HDL 43 03/14/2024    HDL 23 (L) 08/12/2023    HDL 45 03/29/2021     Lab Results   Component Value Date    LDLCALC 45 08/12/2023    LDLCALC 37 (L) 03/29/2021    LDLCALC 72.0 07/08/2020     Lab Results   Component Value Date    TRIG 107 03/14/2024    TRIG 51 08/12/2023    TRIG 81 03/29/2021       Lab Results   Component Value Date    CHOLHDL 3.39 08/12/2023    CHOLHDL 45.9 03/29/2021    CHOLHDL 30.7 07/08/2020       Assessment:   53 year old male with PMHx CAD (CABG x2 2018), CVA (2016), brain aneurysm (with clipping), HTN, HLD has findings concerning for unstable vs stable angina in the setting of previous CABG and non-adherence with anti-platelet therapy; BP is controlled currently, LDL 76    - will schedule stress test  - will start patient on aspirin 81 mg  - will restart lipitor 40 mg  - will restart on Toprol 25 mg  - will give patient nitroglycerin      Dallas Arroyo MS4      Future Appointments   Date Time Provider Department Center   3/26/2024  8:30 AM Karen Patterson MD [facility] CARD [place]    3/26/2024  9:30 AM EEG, [facility] EEG [place]    4/15/2024  3:00 PM Shayna Rosa FNP [facility] INTMED [place]    5/28/2024  1:00 PM Salvador Dennis MD [facility]   10/23/2024  9:00 AM Niurka Calles MD [facility] NEURO [place]

## 2024-03-26 NOTE — PROCEDURES
ROUTINE EEG    Date/Time: 3/26/2024 9:30 AM    Performed by: Niurka Calles MD  Authorized by: Shayna Rosa FNP      Indication: seizure disorder    Clinical Information: This is a 53 year old male with history of right intracranial aneurysm s/p right crani with seizure disorder     Anticonvulsants: Topiramate 100 mg twice a day    Recording Condition: This is a routine electroencephalogram performed in outpatient settings using modulR/Sciona software. Electroencephalogram leads were placed using a 10-20 placement system. The electroencephalogram is monitored in real time by a technologist and is of good technical quality for review.     Technique: Electroencephalogram leads were placed using a 10-20 placement system and electrode impedance was maintained below 10KOhms. Gee and seizure detection computer program was used for digital EEG analysis throughout the monitoring period, to screen the EEG in real time and liseth the data file with pointers to electrographic seizure and interictal discharges. Hyperventilation was not performed and Photic stimulation was not performed.     Description:   Background Symmetry- Mild Asymmetry: intermittent right temporal slowing with increased amplitude   Frequency - Beta and Alpha  Voltage - Normal   Continuity - Continuous  Anterior to Posterior Gradient - Present  Posterior Dominant Rhythm - 10 Hz   Reactivity - Unclear  State Changes - Present without sleep stage N2 transients   Breach Artifact - Present: right frontal.    Cyclic Alternating Pattern of Encephalopathy (CAPE) - Absent  Sporadic Epileptiform Discharges - Present: frequent right frontal spike and wave and polyspike and wave discharges with max at F4.   Rhythmic or Periodic Patterns (RPPs) - Present: one episode of synchronous asymmetric bilateral frontal and temporal triphasic sharply contoured rhythmic delta with maximum amplitude at right temporal T4.     Electroclinical Seizures (ECSz):  Absent  Electroclinical Status Epilepticus (ECSE): Absent  Electrographical Seizures (Esz): Absent    Briefly Potentially Ictal Rhythmic Discharges (BIRDs): Absent  Ictal-Interictal Continuum (IIC): Absent    Conclusion: This is an abnormal routine awake and drowsy EEG with right frontal and right temporal epileptiform discharge and focal dysfunction with superimposing breach artifact.     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 There is however no ongoing seizure activity, nor is there any evidence of status epilepticus in this study. Clinical correlation is advised.

## 2024-04-08 ENCOUNTER — HOSPITAL ENCOUNTER (OUTPATIENT)
Dept: RADIOLOGY | Facility: HOSPITAL | Age: 54
Discharge: HOME | End: 2024-04-08
Attending: INTERNAL MEDICINE
Payer: MEDICAID

## 2024-04-08 ENCOUNTER — HOSPITAL ENCOUNTER (OUTPATIENT)
Dept: CARDIOLOGY | Facility: HOSPITAL | Age: 54
Discharge: HOME | End: 2024-04-08
Attending: INTERNAL MEDICINE
Payer: MEDICAID

## 2024-04-08 VITALS
WEIGHT: 159.19 LBS | SYSTOLIC BLOOD PRESSURE: 109 MMHG | HEIGHT: 74 IN | HEART RATE: 72 BPM | RESPIRATION RATE: 20 BRPM | DIASTOLIC BLOOD PRESSURE: 88 MMHG | BODY MASS INDEX: 20.43 KG/M2

## 2024-04-08 DIAGNOSIS — I20.89 STABLE ANGINA PECTORIS: ICD-10-CM

## 2024-04-08 DIAGNOSIS — I25.810 CORONARY ARTERY DISEASE INVOLVING CORONARY BYPASS GRAFT OF NATIVE HEART WITHOUT ANGINA PECTORIS: ICD-10-CM

## 2024-04-08 LAB
CV STRESS BASE HR: 74 BPM
DIASTOLIC BLOOD PRESSURE: 88 MMHG
NUC REST EJECTION FRACTION: 62
NUC STRESS EJECTION FRACTION: 73 %
OHS CV CPX 85 PERCENT MAX PREDICTED HEART RATE MALE: 143
OHS CV CPX ESTIMATED METS: 1
OHS CV CPX MAX PREDICTED HEART RATE: 168
OHS CV CPX PATIENT IS FEMALE: 0
OHS CV CPX PATIENT IS MALE: 1
OHS CV CPX PEAK DIASTOLIC BLOOD PRESSURE: 88 MMHG
OHS CV CPX PEAK HEAR RATE: 116 BPM
OHS CV CPX PEAK RATE PRESSURE PRODUCT: NORMAL
OHS CV CPX PEAK SYSTOLIC BLOOD PRESSURE: 109 MMHG
OHS CV CPX PERCENT MAX PREDICTED HEART RATE ACHIEVED: 69
OHS CV CPX RATE PRESSURE PRODUCT PRESENTING: 8066
OHS QRS DURATION: 80 MS
OHS QTC CALCULATION: 447 MS
STRESS ECHO POST EXERCISE DUR MIN: 6 MINUTES
STRESS ECHO POST EXERCISE DUR SEC: 14 SECONDS
SYSTOLIC BLOOD PRESSURE: 109 MMHG

## 2024-04-08 PROCEDURE — 93017 CV STRESS TEST TRACING ONLY: CPT

## 2024-04-08 PROCEDURE — 78452 HT MUSCLE IMAGE SPECT MULT: CPT

## 2024-04-08 PROCEDURE — A9502 TC99M TETROFOSMIN: HCPCS | Performed by: INTERNAL MEDICINE

## 2024-04-08 RX ORDER — REGADENOSON 0.08 MG/ML
0.4 INJECTION, SOLUTION INTRAVENOUS
Status: DISPENSED | OUTPATIENT
Start: 2024-04-08

## 2024-04-08 RX ADMIN — TETROFOSMIN 10.4 MILLICURIE: 1.38 INJECTION, POWDER, LYOPHILIZED, FOR SOLUTION INTRAVENOUS at 09:04

## 2024-04-08 RX ADMIN — TETROFOSMIN 29.6 MILLICURIE: 1.38 INJECTION, POWDER, LYOPHILIZED, FOR SOLUTION INTRAVENOUS at 10:04

## 2024-04-16 ENCOUNTER — OFFICE VISIT (OUTPATIENT)
Dept: INTERNAL MEDICINE | Facility: CLINIC | Age: 54
End: 2024-04-16
Payer: MEDICAID

## 2024-04-16 ENCOUNTER — OFFICE VISIT (OUTPATIENT)
Dept: CARDIOLOGY | Facility: CLINIC | Age: 54
End: 2024-04-16
Payer: MEDICAID

## 2024-04-16 VITALS
TEMPERATURE: 98 F | DIASTOLIC BLOOD PRESSURE: 84 MMHG | HEIGHT: 72 IN | RESPIRATION RATE: 18 BRPM | HEART RATE: 71 BPM | BODY MASS INDEX: 22.37 KG/M2 | WEIGHT: 165.13 LBS | SYSTOLIC BLOOD PRESSURE: 130 MMHG

## 2024-04-16 VITALS
BODY MASS INDEX: 20.71 KG/M2 | RESPIRATION RATE: 20 BRPM | WEIGHT: 161.38 LBS | HEART RATE: 63 BPM | DIASTOLIC BLOOD PRESSURE: 84 MMHG | OXYGEN SATURATION: 98 % | SYSTOLIC BLOOD PRESSURE: 135 MMHG | HEIGHT: 74 IN | TEMPERATURE: 98 F

## 2024-04-16 DIAGNOSIS — E78.49 OTHER HYPERLIPIDEMIA: ICD-10-CM

## 2024-04-16 DIAGNOSIS — I25.810 CORONARY ARTERY DISEASE INVOLVING CORONARY BYPASS GRAFT OF NATIVE HEART WITHOUT ANGINA PECTORIS: Primary | ICD-10-CM

## 2024-04-16 DIAGNOSIS — R06.02 SHORTNESS OF BREATH: ICD-10-CM

## 2024-04-16 DIAGNOSIS — I10 PRIMARY HYPERTENSION: Chronic | ICD-10-CM

## 2024-04-16 DIAGNOSIS — I20.89 STABLE ANGINA PECTORIS: ICD-10-CM

## 2024-04-16 DIAGNOSIS — G43.909 MIGRAINE WITHOUT STATUS MIGRAINOSUS, NOT INTRACTABLE, UNSPECIFIED MIGRAINE TYPE: ICD-10-CM

## 2024-04-16 DIAGNOSIS — E55.9 VITAMIN D DEFICIENCY: ICD-10-CM

## 2024-04-16 DIAGNOSIS — Z87.891 FORMER SMOKER: ICD-10-CM

## 2024-04-16 DIAGNOSIS — I10 PRIMARY HYPERTENSION: ICD-10-CM

## 2024-04-16 PROCEDURE — 3008F BODY MASS INDEX DOCD: CPT | Mod: CPTII,,, | Performed by: NURSE PRACTITIONER

## 2024-04-16 PROCEDURE — 1160F RVW MEDS BY RX/DR IN RCRD: CPT | Mod: CPTII,,, | Performed by: NURSE PRACTITIONER

## 2024-04-16 PROCEDURE — 99214 OFFICE O/P EST MOD 30 MIN: CPT | Mod: S$PBB,,, | Performed by: NURSE PRACTITIONER

## 2024-04-16 PROCEDURE — 3079F DIAST BP 80-89 MM HG: CPT | Mod: CPTII,,, | Performed by: NURSE PRACTITIONER

## 2024-04-16 PROCEDURE — 3075F SYST BP GE 130 - 139MM HG: CPT | Mod: CPTII,,, | Performed by: NURSE PRACTITIONER

## 2024-04-16 PROCEDURE — 3044F HG A1C LEVEL LT 7.0%: CPT | Mod: CPTII,,, | Performed by: NURSE PRACTITIONER

## 2024-04-16 PROCEDURE — 1159F MED LIST DOCD IN RCRD: CPT | Mod: CPTII,,, | Performed by: NURSE PRACTITIONER

## 2024-04-16 PROCEDURE — 99215 OFFICE O/P EST HI 40 MIN: CPT | Mod: PBBFAC,27 | Performed by: INTERNAL MEDICINE

## 2024-04-16 PROCEDURE — 99213 OFFICE O/P EST LOW 20 MIN: CPT | Mod: PBBFAC | Performed by: NURSE PRACTITIONER

## 2024-04-16 RX ORDER — AMITRIPTYLINE HYDROCHLORIDE 25 MG/1
25 TABLET, FILM COATED ORAL NIGHTLY
Qty: 30 TABLET | Refills: 2 | Status: SHIPPED | OUTPATIENT
Start: 2024-04-16 | End: 2024-05-28

## 2024-04-16 RX ORDER — ALBUTEROL SULFATE 90 UG/1
2 AEROSOL, METERED RESPIRATORY (INHALATION) EVERY 6 HOURS PRN
Qty: 8.5 G | Refills: 1 | Status: SHIPPED | OUTPATIENT
Start: 2024-04-16 | End: 2024-05-29

## 2024-04-16 RX ORDER — ASPIRIN 325 MG
50000 TABLET, DELAYED RELEASE (ENTERIC COATED) ORAL
Qty: 12 CAPSULE | Refills: 0 | Status: SHIPPED | OUTPATIENT
Start: 2024-04-16

## 2024-04-16 NOTE — PROGRESS NOTES
04/16/2024 8:23 AM    Subjective:     CHIEF COMPLAINT:   Chief Complaint   Patient presents with    f/u sts has chest pain daily with sob no questions                            HPI:    Mr. Marvin Leslie is a 54 y.o. male PMHx multiple hemorrhagic CVAs and arterial aneurysms s/p clipping and M2 aneurysm bypass using a SVG graft.  The patient previously reported a coronary artery bypass surgery but since last visit records have been obtained from [place], [place] and the bypass of the brain aneurysm is the only major surgery he has had. He does not know if he was ever evaluated for vasculitis.  No history of CAD per outside records.  Currently taking asa, statin, beta blocker.  Prn nitro given at last visit but has not taken any because he thought it was only for absolute emergencies.  Still reports daily episodes of chest tightness associated with shortness of breath and diaphoresis.  Mobility is limited by prior CVA and he is not very active.  Denies lower extremity swelling, orthopnea, palpitations, syncope.    Past Medical History    Patient Active Problem List   Diagnosis    Bipolar I disorder, severe, current or most recent episode depressed, with psychotic features    Seizure disorder    Subarachnoid hematoma    Coronary artery disease involving coronary bypass graft    Other hyperlipidemia    Hypertension    Physical therapy evaluation, initial    Need for occupational therapy assessment    Hemiplegia of left nondominant side as late effect of cerebrovascular disease    Encounter for screening colonoscopy    Stable angina pectoris       Surgical History    Past Surgical History:   Procedure Laterality Date    BYPASS OF MESENTERIC ARTERY      CORONARY ARTERY BYPASS GRAFT  12/24/2018    2v    FRACTURE SURGERY      REPAIR OF ANEURYSM      (2.)2016 and 2018       Social History    Social History     Socioeconomic History    Marital status:     Number of children: 0   Tobacco Use    Smoking status: Former      Current packs/day: 0.00     Average packs/day: 1 pack/day for 34.0 years (34.0 ttl pk-yrs)     Types: Cigarettes     Start date: 11/3/1987     Quit date: 11/3/2021     Years since quittin.4    Smokeless tobacco: Never   Substance and Sexual Activity    Alcohol use: Not Currently    Drug use: Not Currently     Comment: past     Social Determinants of Health     Financial Resource Strain: Patient Declined (3/14/2024)    Overall Financial Resource Strain (CARDIA)     Difficulty of Paying Living Expenses: Patient declined   Food Insecurity: Patient Declined (3/14/2024)    Hunger Vital Sign     Worried About Running Out of Food in the Last Year: Patient declined     Ran Out of Food in the Last Year: Patient declined   Transportation Needs: Patient Declined (3/14/2024)    PRAPARE - Transportation     Lack of Transportation (Medical): Patient declined     Lack of Transportation (Non-Medical): Patient declined   Physical Activity: Patient Declined (3/14/2024)    Exercise Vital Sign     Days of Exercise per Week: Patient declined     Minutes of Exercise per Session: Patient declined   Stress: Patient Declined (3/14/2024)    [employer] [place] [employer] - Occupational Stress Questionnaire     Feeling of Stress : Patient declined   Social Connections: Patient Declined (3/14/2024)    Social Connection and Isolation Panel [NHANES]     Frequency of Communication with Friends and Family: Patient declined     Frequency of Social Gatherings with Friends and Family: Patient declined     Attends [employer] Services: Patient declined     Active Member of Clubs or Organizations: Patient declined     Attends Club or Organization Meetings: Patient declined     Marital Status: Patient declined   Housing Stability: Patient Declined (3/14/2024)    Housing Stability Vital Sign     Unable to Pay for Housing in the Last Year: Patient declined     Unstable Housing in the Last Year: Patient declined       Family History     Family History   Problem Relation Name Age of Onset    Cancer Father         Allergy  Review of patient's allergies indicates:  No Known Allergies    Current Medications    Current Outpatient Medications:     atorvastatin (LIPITOR) 40 MG tablet, Take 1 tablet (40 mg total) by mouth once daily., Disp: 90 tablet, Rfl: 3    metoprolol succinate (TOPROL-XL) 25 MG 24 hr tablet, Take 1 tablet by mouth once daily., Disp: 90 tablet, Rfl: 3    nitroGLYCERIN (NITROSTAT) 0.4 MG SL tablet, Place 1 tablet (0.4 mg total) under the tongue every 5 (five) minutes as needed for Chest pain., Disp: 30 tablet, Rfl: 2    albuterol (PROVENTIL HFA) 90 mcg/actuation inhaler, Inhale 2 puffs into the lungs every 6 (six) hours as needed for wheezing. Rescue (Patient not taking: Reported on 4/16/2024), Disp: 8.5 g, Rfl: 14    ARIPiprazole (ABILIFY) 20 MG Tab, Take 1 tablet (20 mg total) by mouth once daily. (Patient not taking: Reported on 3/14/2024), Disp: 90 tablet, Rfl: 3    aspirin 81 MG Chew, Chew and swallow 1 tablet by mouth once daily. (Patient not taking: Reported on 4/16/2024), Disp: 90 tablet, Rfl: 3    diclofenac (VOLTAREN) 50 MG EC tablet, Take 1 tablet (50 mg total) by mouth 3 (three) times daily as needed. (Patient not taking: Reported on 3/14/2024), Disp: 20 tablet, Rfl: 2    gabapentin (NEURONTIN) 300 MG capsule, Take 2 capsules by mouth 3 (three) times daily. (Patient not taking: Reported on 3/14/2024), Disp: 540 capsule, Rfl: 3    HYDROXYZINE HCL ORAL, Take by mouth. (Patient not taking: Reported on 4/16/2024), Disp: , Rfl:     hydrOXYzine pamoate (VISTARIL) 25 MG Cap, Take 1 capsule (25 mg total) by mouth 3 (three) times daily. (Patient not taking: Reported on 3/14/2024), Disp: 90 capsule, Rfl: 11    lisinopriL (PRINIVIL,ZESTRIL) 2.5 MG tablet, Take 1 tablet by mouth once daily. (Patient not taking: Reported on 4/16/2024), Disp: , Rfl:     methocarbamoL (ROBAXIN) 500 MG Tab, Take 1 tablet (500 mg total) by mouth 3 (three)  times daily. (Patient not taking: Reported on 3/14/2024), Disp: 90 tablet, Rfl: 11    ondansetron (ZOFRAN) 4 MG tablet, Take 1 tablet (4 mg total) by mouth every 8 (eight) hours as needed. (Patient not taking: Reported on 3/14/2024), Disp: 30 tablet, Rfl: 11    pantoprazole (PROTONIX) 40 MG tablet, Take 1 tablet by mouth once daily (Patient not taking: Reported on 4/16/2024), Disp: 30 tablet, Rfl: 2    prazosin (MINIPRESS) 1 MG Cap, Take 1 capsule (1 mg total) by mouth nightly. (Patient not taking: Reported on 3/14/2024), Disp: 90 capsule, Rfl: 3    sertraline (ZOLOFT) 100 MG tablet, Take 1 tablet by mouth once daily. (Patient not taking: Reported on 3/14/2024), Disp: 90 tablet, Rfl: 3    tamsulosin (FLOMAX) 0.4 mg Cap, Take 1 capsule by mouth once daily. (Patient not taking: Reported on 4/16/2024), Disp: 30 capsule, Rfl: 3    topiramate (TOPAMAX) 100 MG tablet, Take 1 tablet by mouth 2 (two) times daily. (Patient not taking: Reported on 4/16/2024), Disp: 180 tablet, Rfl: 3    traZODone (DESYREL) 100 MG tablet, Take 1 tablet (100 mg total) by mouth nightly. (Patient not taking: Reported on 3/14/2024), Disp: 90 tablet, Rfl: 3    VITAMIN D2 1,250 mcg (50,000 unit) capsule, , Disp: , Rfl:     Current Facility-Administered Medications:     regadenoson injection 0.4 mg, 0.4 mg, Intravenous, 1 time in Clinic/HOD, Karen Patterson MD    ROS:   Please see HPI                                                                                                                                                                            CONSTITUTIONAL:    No fever.  No night sweats.  RESPIRATORY:  No cough.  No hemoptysis.  No wheezing.  SKIN:  No poor wound healing.  No rash.  CARDIOVASCULAR:  No claudication.  No cyanosis.     HEMATOLOGY:   No adenopathy.  No easy bruising.   MUSCULOSKELETAL:  No muscle cramp.  No muscle weakness.  GASTROENTEROLOGY:  No heart burn.  No melena.    NEUROLOGIC:  No dizziness.  No generalized  "weakness.     Objective:     PE:  Blood pressure 135/84, pulse 63, temperature 98.1 °F (36.7 °C), temperature source Oral, resp. rate 20, height 6' 2" (1.88 m), weight 73.2 kg (161 lb 6.4 oz), SpO2 98%.   BP Readings from Last 3 Encounters:   04/16/24 135/84   04/08/24 109/88   03/26/24 128/86       Wt Readings from Last 3 Encounters:   04/16/24 73.2 kg (161 lb 6.4 oz)   04/08/24 72.2 kg (159 lb 2.8 oz)   03/26/24 72.2 kg (159 lb 3.2 oz)     BMI 20.72 kg/m^2    GENERAL:   NAD  CONSTITUTIONAL:  No acute distress.  Not ill appearing.  NECK:  No cervical adenopathy.  No carotid bruit.  CARDIOVASCULAR:  Normal rate.  Regular rhythm.  No murmur.  PULMONARY:  No respiratory distress.  No wheezing.  No crackles.  ABDOMINAL:  No distention.  No tenderness.  MUSCULOSKELETAL:  No deformity.  No edema.  SKIN:  No bruising.  No rash.  NEUROLOGICAL:  Oriented x 3.  No weakness.                                                                                                                                                                                                                                                                                                                                                                                                                                                                               CARDIAC TESTING:  EKG  No results found for this or any previous visit.  Echocardiogram  Results for orders placed during the hospital encounter of 07/20/23    Echo    Interpretation Summary  · The left ventricle is normal in size with mild concentric hypertrophy and normal systolic function.  · Normal left ventricular diastolic function.  · The estimated ejection fraction is 60%.  · Normal right ventricular size with normal right ventricular systolic function.  · Normal central venous pressure (3 mmHg).  · The estimated PA systolic pressure is 28 mmHg.    Stress Test 4/8/24    Abnormal myocardial " perfusion scan.    There are two significant perfusion abnormalities.    Perfusion Abnormality #1 - There is a moderate to severe intensity, moderate sized, reversible perfusion abnormality that is consistent with ischemia in the basal to apical lateral apical wall(s) in the typical distribution of the LCX territory.    Perfusion Abnormality #2 - There is a moderate sized, moderate to severe intensity, reversible perfusion abnormality that is consistent with ischemia in the basal to distal inferoseptal wall(s) in the typical distribution of the RCA territory.    There are no other significant perfusion abnormalities.    The gated perfusion images showed an ejection fraction of 62% at rest. The gated perfusion images showed an ejection fraction of 73% post stress.    The patient reported no chest pain during the stress test.    There were no arrhythmias during stress.     Coronary Angiogram  No results found for this or any previous visit.       Last BMP  BMP  Lab Results   Component Value Date     03/14/2024    K 4.0 03/14/2024     07/01/2021    CO2 25 03/14/2024    BUN 8.9 03/14/2024    CREATININE 0.90 03/14/2024    CALCIUM 9.6 03/14/2024    ANIONGAP 12 07/01/2021    EGFRNORACEVR >60 03/14/2024     Last CBC     Lab Results   Component Value Date    WBC 6.87 03/14/2024    HGB 16.1 03/14/2024    HCT 47.4 03/14/2024    MCV 94.0 03/14/2024     03/14/2024         Last lipids    Lab Results   Component Value Date    CHOL 140 03/14/2024    CHOL 78 08/12/2023    CHOL 98 (L) 03/29/2021     Lab Results   Component Value Date    HDL 43 03/14/2024    HDL 23 (L) 08/12/2023    HDL 45 03/29/2021     Lab Results   Component Value Date    LDLCALC 45 08/12/2023    LDLCALC 37 (L) 03/29/2021    LDLCALC 72.0 07/08/2020     Lab Results   Component Value Date    TRIG 107 03/14/2024    TRIG 51 08/12/2023    TRIG 81 03/29/2021       Lab Results   Component Value Date    CHOLHDL 3.39 08/12/2023    CHOLHDL 45.9 03/29/2021     CHOLHDL 30.7 07/08/2020       Assessment:   53 year old male with PMHx of prior polysubstance abuse, multiple hemorrhagic CVAs and ruptures brain aneurysms s/p SVG conduit.    - will proceed with coronary angiogram in the setting on ongoing chest pain, shortness of breath and positive stress test  - aspirin 81 mg, lipitor 40 mg, Toprol 25 mg  - prn nitro for chest pain  - BP acceptable  - LDL 76 - if vascular disease present will need to target goal of <55      Follow up after angiogram    Gerardo Lemus MD PGY5  [employer] [facility] Fellow        Future Appointments   Date Time Provider Department Center   4/16/2024  2:40 PM Shayna Rosa FNP [facility]TM[facility] [place]    5/28/2024  1:00 PM Salvador Dennis MD [facility]   10/23/2024  9:00 AM Niurka Calles MD [facility] NEURO [place]
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
## 2024-04-16 NOTE — PATIENT INSTRUCTIONS
You are scheduled for a Left Heart Cath on 5/22/24    You will have a pre-op visit prior to procedure to review all instructions

## 2024-04-22 ENCOUNTER — HOSPITAL ENCOUNTER (OUTPATIENT)
Dept: PULMONOLOGY | Facility: HOSPITAL | Age: 54
Discharge: HOME | End: 2024-04-22
Attending: NURSE PRACTITIONER
Payer: MEDICAID

## 2024-04-22 DIAGNOSIS — Z87.891 FORMER SMOKER: ICD-10-CM

## 2024-04-22 DIAGNOSIS — R06.02 SHORTNESS OF BREATH: ICD-10-CM

## 2024-04-22 PROCEDURE — 94729 DIFFUSING CAPACITY: CPT

## 2024-04-22 PROCEDURE — 94640 AIRWAY INHALATION TREATMENT: CPT | Mod: XB

## 2024-04-22 PROCEDURE — 94060 EVALUATION OF WHEEZING: CPT

## 2024-04-22 PROCEDURE — 94726 PLETHYSMOGRAPHY LUNG VOLUMES: CPT

## 2024-04-22 RX ORDER — ALBUTEROL SULFATE 2.5 MG/.5ML
2.5 SOLUTION RESPIRATORY (INHALATION) EVERY 4 HOURS PRN
Status: ACTIVE | OUTPATIENT
Start: 2024-04-22

## 2024-04-22 RX ORDER — ALBUTEROL SULFATE 0.83 MG/ML
SOLUTION RESPIRATORY (INHALATION)
Status: DISCONTINUED
Start: 2024-04-22 | End: 2024-04-23 | Stop reason: HOSPADM

## 2024-04-22 RX ADMIN — ALBUTEROL SULFATE 2.5 MG: 2.5 SOLUTION RESPIRATORY (INHALATION) at 10:04

## 2024-04-22 NOTE — PROGRESS NOTES
Good cooperation and effort given. Albuterol 2.5 mg given HR 72/74, SAT 97%, BBS CL. PFT completed

## 2024-04-23 LAB
DLCO SINGLE BREATH LLN: 26.98
DLCO SINGLE BREATH PRE REF: 76.5 %
DLCO SINGLE BREATH REF: 33.9
DLCOC SBVA LLN: 3.2
DLCOC SBVA REF: 4.27
DLCOC SINGLE BREATH LLN: 26.98
DLCOC SINGLE BREATH REF: 33.9
DLCOVA LLN: 3.2
DLCOVA PRE REF: 94.6 %
DLCOVA PRE: 4.04 ML/(MIN*MMHG*L) (ref 3.2–5.34)
DLCOVA REF: 4.27
ERV LLN: -16448.61
ERV PRE REF: 139.5 %
ERV REF: 1.39
FEF 25 75 CHG: -3.1 %
FEF 25 75 LLN: 1.98
FEF 25 75 POST REF: 48.6 %
FEF 25 75 PRE REF: 50.2 %
FEF 25 75 REF: 3.75
FET100 CHG: -1.3 %
FEV1 CHG: -2.7 %
FEV1 FVC CHG: -1.1 %
FEV1 FVC LLN: 67
FEV1 FVC POST REF: 84.9 %
FEV1 FVC PRE REF: 85.8 %
FEV1 FVC REF: 78
FEV1 LLN: 3.36
FEV1 POST REF: 73.7 %
FEV1 PRE REF: 75.8 %
FEV1 REF: 4.34
FRCPLETH LLN: 2.8
FRCPLETH PREREF: 109.1 %
FRCPLETH REF: 3.79
FVC CHG: -1.7 %
FVC LLN: 4.34
FVC POST REF: 86.4 %
FVC PRE REF: 87.9 %
FVC REF: 5.6
IVC PRE: 4.46 L (ref 4.34–6.86)
IVC SINGLE BREATH LLN: 4.34
IVC SINGLE BREATH PRE REF: 79.7 %
IVC SINGLE BREATH REF: 5.6
MVV LLN: 138
MVV PRE REF: 67.7 %
MVV REF: 162
PEF CHG: -5.9 %
PEF LLN: 8.01
PEF POST REF: 75.6 %
PEF PRE REF: 80.4 %
PEF REF: 10.6
POST FEF 25 75: 1.82 L/S (ref 1.98–6.08)
POST FET 100: 14.04 SEC
POST FEV1 FVC: 66.16 % (ref 66.83–87.47)
POST FEV1: 3.2 L (ref 3.36–5.27)
POST FVC: 4.84 L (ref 4.34–6.86)
POST PEF: 8.02 L/S (ref 8.01–13.19)
PRE DLCO: 25.92 ML/(MIN*MMHG) (ref 26.98–40.83)
PRE ERV: 1.94 L (ref -16448.61–16451.39)
PRE FEF 25 75: 1.88 L/S (ref 1.98–6.08)
PRE FET 100: 14.22 SEC
PRE FEV1 FVC: 66.88 % (ref 66.83–87.47)
PRE FEV1: 3.29 L (ref 3.36–5.27)
PRE FRC PL: 4.13 L (ref 2.8–4.77)
PRE FVC: 4.92 L (ref 4.34–6.86)
PRE MVV: 109.75 L/MIN (ref 137.88–186.54)
PRE PEF: 8.52 L/S (ref 8.01–13.19)
PRE RV: 2.19 L (ref 1.72–3.07)
PRE TLC: 7.11 L (ref 6.79–9.09)
RAW LLN: 3.06
RAW PRE REF: 44.3 %
RAW PRE: 1.36 CMH2O*S/L (ref 3.06–3.06)
RAW REF: 3.06
RV LLN: 1.72
RV PRE REF: 91.4 %
RV REF: 2.4
RVTLC LLN: 26
RVTLC PRE REF: 89 %
RVTLC PRE: 30.83 % (ref 25.65–43.61)
RVTLC REF: 35
TLC LLN: 6.79
TLC PRE REF: 89.6 %
TLC REF: 7.94
VA PRE: 6.41 L (ref 7.79–7.79)
VA SINGLE BREATH LLN: 7.79
VA SINGLE BREATH PRE REF: 82.4 %
VA SINGLE BREATH REF: 7.79
VC LLN: 4.34
VC PRE REF: 87.9 %
VC PRE: 4.92 L (ref 4.34–6.86)
VC REF: 5.6

## 2024-04-23 PROCEDURE — 94729 DIFFUSING CAPACITY: CPT | Mod: 26,,, | Performed by: INTERNAL MEDICINE

## 2024-04-23 PROCEDURE — 94726 PLETHYSMOGRAPHY LUNG VOLUMES: CPT | Mod: 26,,, | Performed by: INTERNAL MEDICINE

## 2024-04-23 PROCEDURE — 94060 EVALUATION OF WHEEZING: CPT | Mod: 26,,, | Performed by: INTERNAL MEDICINE

## 2024-05-14 ENCOUNTER — CLINICAL SUPPORT (OUTPATIENT)
Dept: CARDIOLOGY | Facility: CLINIC | Age: 54
End: 2024-05-14
Payer: MEDICAID

## 2024-05-14 VITALS
BODY MASS INDEX: 20.77 KG/M2 | HEART RATE: 82 BPM | RESPIRATION RATE: 18 BRPM | HEIGHT: 74 IN | TEMPERATURE: 98 F | WEIGHT: 161.81 LBS | DIASTOLIC BLOOD PRESSURE: 72 MMHG | OXYGEN SATURATION: 95 % | SYSTOLIC BLOOD PRESSURE: 111 MMHG

## 2024-05-14 DIAGNOSIS — I25.810 CORONARY ARTERY DISEASE INVOLVING CORONARY BYPASS GRAFT OF NATIVE HEART WITHOUT ANGINA PECTORIS: Primary | ICD-10-CM

## 2024-05-14 LAB
ANION GAP SERPL CALC-SCNC: 6 MEQ/L
APTT PPP: 28.9 SECONDS (ref 23.2–33.7)
BASOPHILS # BLD AUTO: 0.07 X10(3)/MCL
BASOPHILS NFR BLD AUTO: 0.9 %
BUN SERPL-MCNC: 10.7 MG/DL (ref 8.4–25.7)
CALCIUM SERPL-MCNC: 9.3 MG/DL (ref 8.4–10.2)
CHLORIDE SERPL-SCNC: 108 MMOL/L (ref 98–107)
CO2 SERPL-SCNC: 26 MMOL/L (ref 22–29)
CREAT SERPL-MCNC: 0.86 MG/DL (ref 0.73–1.18)
CREAT/UREA NIT SERPL: 12
EOSINOPHIL # BLD AUTO: 0.17 X10(3)/MCL (ref 0–0.9)
EOSINOPHIL NFR BLD AUTO: 2.2 %
ERYTHROCYTE [DISTWIDTH] IN BLOOD BY AUTOMATED COUNT: 12 % (ref 11.5–17)
GFR SERPLBLD CREATININE-BSD FMLA CKD-EPI: >60 ML/MIN/1.73/M2
GLUCOSE SERPL-MCNC: 82 MG/DL (ref 74–100)
HCT VFR BLD AUTO: 44.6 % (ref 42–52)
HGB BLD-MCNC: 14.8 G/DL (ref 14–18)
IMM GRANULOCYTES # BLD AUTO: 0.02 X10(3)/MCL (ref 0–0.04)
IMM GRANULOCYTES NFR BLD AUTO: 0.3 %
INR PPP: 1
LYMPHOCYTES # BLD AUTO: 1.16 X10(3)/MCL (ref 0.6–4.6)
LYMPHOCYTES NFR BLD AUTO: 14.7 %
MCH RBC QN AUTO: 31.8 PG (ref 27–31)
MCHC RBC AUTO-ENTMCNC: 33.2 G/DL (ref 33–36)
MCV RBC AUTO: 95.7 FL (ref 80–94)
MONOCYTES # BLD AUTO: 0.46 X10(3)/MCL (ref 0.1–1.3)
MONOCYTES NFR BLD AUTO: 5.8 %
NEUTROPHILS # BLD AUTO: 6 X10(3)/MCL (ref 2.1–9.2)
NEUTROPHILS NFR BLD AUTO: 76.1 %
NRBC BLD AUTO-RTO: 0 %
PLATELET # BLD AUTO: 262 X10(3)/MCL (ref 130–400)
PMV BLD AUTO: 9.6 FL (ref 7.4–10.4)
POTASSIUM SERPL-SCNC: 4.2 MMOL/L (ref 3.5–5.1)
PROTHROMBIN TIME: 13.2 SECONDS (ref 11.4–14)
RBC # BLD AUTO: 4.66 X10(6)/MCL (ref 4.7–6.1)
SODIUM SERPL-SCNC: 140 MMOL/L (ref 136–145)
WBC # SPEC AUTO: 7.88 X10(3)/MCL (ref 4.5–11.5)

## 2024-05-14 PROCEDURE — 93005 ELECTROCARDIOGRAM TRACING: CPT

## 2024-05-14 PROCEDURE — 85025 COMPLETE CBC W/AUTO DIFF WBC: CPT

## 2024-05-14 PROCEDURE — 36415 COLL VENOUS BLD VENIPUNCTURE: CPT

## 2024-05-14 PROCEDURE — 80048 BASIC METABOLIC PNL TOTAL CA: CPT

## 2024-05-14 PROCEDURE — 99213 OFFICE O/P EST LOW 20 MIN: CPT | Mod: PBBFAC,25

## 2024-05-14 PROCEDURE — 85730 THROMBOPLASTIN TIME PARTIAL: CPT

## 2024-05-14 PROCEDURE — 85610 PROTHROMBIN TIME: CPT

## 2024-05-14 RX ORDER — TAMSULOSIN HYDROCHLORIDE 0.4 MG/1
1 CAPSULE ORAL
COMMUNITY
Start: 2024-04-17

## 2024-05-16 LAB
OHS QRS DURATION: 88 MS
OHS QTC CALCULATION: 434 MS

## 2024-05-22 ENCOUNTER — HOSPITAL ENCOUNTER (OUTPATIENT)
Facility: HOSPITAL | Age: 54
Discharge: HOME | End: 2024-05-22
Attending: INTERNAL MEDICINE | Admitting: INTERNAL MEDICINE
Payer: MEDICAID

## 2024-05-22 VITALS
HEART RATE: 57 BPM | DIASTOLIC BLOOD PRESSURE: 82 MMHG | BODY MASS INDEX: 21.43 KG/M2 | SYSTOLIC BLOOD PRESSURE: 126 MMHG | TEMPERATURE: 98 F | OXYGEN SATURATION: 98 % | RESPIRATION RATE: 16 BRPM | WEIGHT: 167 LBS | HEIGHT: 74 IN

## 2024-05-22 DIAGNOSIS — I25.810 CORONARY ARTERY DISEASE INVOLVING CORONARY BYPASS GRAFT OF NATIVE HEART WITHOUT ANGINA PECTORIS: ICD-10-CM

## 2024-05-22 PROCEDURE — 99152 MOD SED SAME PHYS/QHP 5/>YRS: CPT | Performed by: INTERNAL MEDICINE

## 2024-05-22 PROCEDURE — 25500020 PHARM REV CODE 255: Performed by: INTERNAL MEDICINE

## 2024-05-22 PROCEDURE — C1894 INTRO/SHEATH, NON-LASER: HCPCS | Performed by: INTERNAL MEDICINE

## 2024-05-22 PROCEDURE — C1760 CLOSURE DEV, VASC: HCPCS | Performed by: INTERNAL MEDICINE

## 2024-05-22 PROCEDURE — C1769 GUIDE WIRE: HCPCS | Performed by: INTERNAL MEDICINE

## 2024-05-22 PROCEDURE — 93458 L HRT ARTERY/VENTRICLE ANGIO: CPT | Performed by: INTERNAL MEDICINE

## 2024-05-22 PROCEDURE — 25000003 PHARM REV CODE 250: Performed by: INTERNAL MEDICINE

## 2024-05-22 PROCEDURE — 63600175 PHARM REV CODE 636 W HCPCS: Performed by: INTERNAL MEDICINE

## 2024-05-22 RX ORDER — DIAZEPAM 5 MG/1
5 TABLET ORAL ONCE
Status: COMPLETED | OUTPATIENT
Start: 2024-05-22 | End: 2024-05-22

## 2024-05-22 RX ORDER — SODIUM CHLORIDE 9 MG/ML
0-999 INJECTION, SOLUTION INTRAVENOUS CONTINUOUS
Status: DISCONTINUED | OUTPATIENT
Start: 2024-05-22 | End: 2024-05-22 | Stop reason: HOSPADM

## 2024-05-22 RX ORDER — LIDOCAINE HYDROCHLORIDE 10 MG/ML
INJECTION INFILTRATION; PERINEURAL
Status: DISCONTINUED | OUTPATIENT
Start: 2024-05-22 | End: 2024-05-22 | Stop reason: HOSPADM

## 2024-05-22 RX ORDER — ONDANSETRON 4 MG/1
8 TABLET, ORALLY DISINTEGRATING ORAL EVERY 8 HOURS PRN
Status: DISCONTINUED | OUTPATIENT
Start: 2024-05-22 | End: 2024-05-22 | Stop reason: HOSPADM

## 2024-05-22 RX ORDER — FENTANYL CITRATE 50 UG/ML
INJECTION, SOLUTION INTRAMUSCULAR; INTRAVENOUS
Status: DISCONTINUED | OUTPATIENT
Start: 2024-05-22 | End: 2024-05-22 | Stop reason: HOSPADM

## 2024-05-22 RX ORDER — ACETAMINOPHEN 325 MG/1
650 TABLET ORAL EVERY 4 HOURS PRN
Status: DISCONTINUED | OUTPATIENT
Start: 2024-05-22 | End: 2024-05-22 | Stop reason: HOSPADM

## 2024-05-22 RX ORDER — DIPHENHYDRAMINE HCL 25 MG
25 CAPSULE ORAL ONCE
Status: COMPLETED | OUTPATIENT
Start: 2024-05-22 | End: 2024-05-22

## 2024-05-22 RX ORDER — MIDAZOLAM HYDROCHLORIDE 5 MG/ML
INJECTION INTRAMUSCULAR; INTRAVENOUS
Status: DISCONTINUED | OUTPATIENT
Start: 2024-05-22 | End: 2024-05-22 | Stop reason: HOSPADM

## 2024-05-22 RX ADMIN — DIAZEPAM 5 MG: 5 TABLET ORAL at 06:05

## 2024-05-22 RX ADMIN — SODIUM CHLORIDE 75 ML/HR: 9 INJECTION, SOLUTION INTRAVENOUS at 06:05

## 2024-05-22 RX ADMIN — DIPHENHYDRAMINE HYDROCHLORIDE 25 MG: 25 CAPSULE ORAL at 06:05

## 2024-05-22 NOTE — Clinical Note
46 ml of contrast were injected throughout the case. 10 mL of contrast was the total wasted during the case. 56 mL was the total amount used during the case.

## 2024-05-22 NOTE — DISCHARGE INSTRUCTIONS
No heavy lifting or strenuous activities X 5 days. No activities that may cause an infection at the puncture site               X 5 days. No tub baths X 5 days, Showers only.               If your puncture site starts to bleed, lie down. Put pressure on it until the bleeding stops.   Be sure to wash your hands before touching your wound or dressing.  Take small walks around your house. Get enough rest.  You should drink plenty water to flush the dye out of your body over the next 3 day.  Avoid straining when having a bowel movement. Eat a lot of fiber-rich foods like fruits, whole grains, and vegetables.

## 2024-05-22 NOTE — BRIEF OP NOTE
Angiogram, Coronary, with Left Heart Cath Brief Op Note  Patient Name: Marvin Leslie  MRN: [id number]  : 1970  Date of Procedure: 2024  Location of Procedure: [facility] CATH LAB    Procedure: Angiogram, Coronary, with Left Heart Cath    Pre-op Dx:   Abnormal stress test     Post-op Dx:   Normal coronaries    Staff: Surgeon(s):  Khang Fletcher MD     Access:   Right radial artery    Findings:   Normal coronaries  LVEDP: 12mmHg           Complications:  No immediate complications            Disposition:  7E with plans to discharge           Condition:  stable     Impression:  Successful coronary angiogram    Plan:  Medical management       Khang Fletcher MD  Interventional Cardiology   2024 8:35 AM

## 2024-05-22 NOTE — H&P
2024 7:29 AM    Subjective:     CHIEF COMPLAINT: No chief complaint on file.                          HPI:    Mr. Marvin Leslie is a 54 y.o. male.  The patient is scheduled for [facility] .  His PMHx is sig for multiple hemorrhagic CVAs and arterial aneurysms s/p clipping and M2 aneurysm bypass using a SVG graft. No history of CAD per outside records.  Currently taking asa, statin, beta blocker.  Prn nitro given at last visit but has not taken any because he thought it was only for absolute emergencies.  Still reports daily episodes of chest tightness associated with shortness of breath and diaphoresis.  Mobility is limited by prior CVA and he is not very active.  Denies lower extremity swelling, orthopnea, palpitations, syncope     Past Medical History    Patient Active Problem List   Diagnosis    Bipolar I disorder, severe, current or most recent episode depressed, with psychotic features    Seizure disorder    Subarachnoid hematoma    Other hyperlipidemia    Hypertension    Physical therapy evaluation, initial    Need for occupational therapy assessment    Hemiplegia of left nondominant side as late effect of cerebrovascular disease    Encounter for screening colonoscopy    Stable angina pectoris    Shortness of breath       Surgical History    Past Surgical History:   Procedure Laterality Date    BYPASS OF MESENTERIC ARTERY      FRACTURE SURGERY      REPAIR OF ANEURYSM      (2.)2016 and 2018       Social History     Socioeconomic History    Marital status:     Number of children: 0   Occupational History    Occupation: Unemployed   Tobacco Use    Smoking status: Former     Current packs/day: 0.00     Average packs/day: 1 pack/day for 34.0 years (34.0 ttl pk-yrs)     Types: Cigarettes     Start date: 11/3/1987     Quit date: 11/3/2021     Years since quittin.5    Smokeless tobacco: Never   Substance and Sexual Activity    Alcohol use: Not Currently    Drug use: Not Currently     Comment: past      Social Determinants of Health     Financial Resource Strain: Patient Declined (3/14/2024)    Overall Financial Resource Strain (CARDIA)     Difficulty of Paying Living Expenses: Patient declined   Food Insecurity: Patient Declined (3/14/2024)    Hunger Vital Sign     Worried About Running Out of Food in the Last Year: Patient declined     Ran Out of Food in the Last Year: Patient declined   Transportation Needs: Patient Declined (3/14/2024)    PRAPARE - Transportation     Lack of Transportation (Medical): Patient declined     Lack of Transportation (Non-Medical): Patient declined   Physical Activity: Patient Declined (3/14/2024)    Exercise Vital Sign     Days of Exercise per Week: Patient declined     Minutes of Exercise per Session: Patient declined   Stress: Patient Declined (3/14/2024)    [employer] [place] [employer] - Occupational Stress Questionnaire     Feeling of Stress : Patient declined   Housing Stability: Patient Declined (3/14/2024)    Housing Stability Vital Sign     Unable to Pay for Housing in the Last Year: Patient declined     Unstable Housing in the Last Year: Patient declined       Family History   Problem Relation Name Age of Onset    Cancer Father       Review of patient's allergies indicates:  No Known Allergies    Current Medications    Current Outpatient Medications   Medication Instructions    albuterol (PROVENTIL HFA) 90 mcg/actuation inhaler 2 puffs, Inhalation, Every 6 hours PRN    amitriptyline (ELAVIL) 25 mg, Oral, Nightly    ARIPiprazole (ABILIFY) 20 mg, Oral, Daily    aspirin 81 MG Chew Chew and swallow 1 tablet by mouth once daily.    atorvastatin (LIPITOR) 40 mg, Oral, Daily    cholecalciferol (vitamin D3) 50,000 Units, Oral, Every 7 days    metoprolol succinate (TOPROL-XL) 25 MG 24 hr tablet Take 1 tablet by mouth once daily.    nitroGLYCERIN (NITROSTAT) 0.4 mg, Sublingual, Every 5 min PRN    pantoprazole (PROTONIX) 40 MG tablet Take 1 tablet by mouth once daily     "tamsulosin (FLOMAX) 0.4 mg Cap 1 capsule, Oral         ROS:   refer to HPI     Objective:     BP Readings from Last 3 Encounters:   05/22/24 126/86   05/14/24 111/72   04/16/24 130/84        Pulse Readings from Last 3 Encounters:   05/22/24 62   05/14/24 82   04/16/24 71        Temp Readings from Last 3 Encounters:   05/22/24 97.4 °F (36.3 °C) (Oral)   05/14/24 98.1 °F (36.7 °C)   04/16/24 97.8 °F (36.6 °C) (Oral)       Wt Readings from Last 3 Encounters:   05/22/24 75.8 kg (167 lb)   05/14/24 73.4 kg (161 lb 12.8 oz)   04/16/24 74.9 kg (165 lb 2 oz)         PE:  Blood pressure 126/86, pulse 62, temperature 97.4 °F (36.3 °C), temperature source Oral, resp. rate 18, height 6' 2" (1.88 m), weight 75.8 kg (167 lb), SpO2 96%.   GENERAL:  NAD   CONSTITUTIONAL:  No acute distress.  Not ill appearing.  NECK:  Supple  CARDIOVASCULAR:  Normal rate.  Regular rhythm.   PULMONARY:  No respiratory distress.  No audible wheezing.    ABDOMINAL:  No distention.    SKIN:  No bruising.  No rash.  NEUROLOGICAL:  awake and alert                                                                                                                                                                                                                                                                                                                                                                                                                                                                              CARDIAC TESTING:  Echocardiogram  Results for orders placed during the hospital encounter of 07/20/23    Echo    Interpretation Summary  · The left ventricle is normal in size with mild concentric hypertrophy and normal systolic function.  · Normal left ventricular diastolic function.  · The estimated ejection fraction is 60%.  · Normal right ventricular size with normal right ventricular systolic function.  · Normal central venous pressure (3 mmHg).  · The " estimated PA systolic pressure is 28 mmHg.      Stress Test  Results for orders placed during the hospital encounter of 04/08/24    Nuclear Stress - Cardiology Interpreted    Interpretation Summary    Abnormal myocardial perfusion scan.    There are two significant perfusion abnormalities.    Perfusion Abnormality #1 - There is a moderate to severe intensity, moderate sized, reversible perfusion abnormality that is consistent with ischemia in the basal to apical lateral apical wall(s) in the typical distribution of the LCX territory.    Perfusion Abnormality #2 - There is a moderate sized, moderate to severe intensity, reversible perfusion abnormality that is consistent with ischemia in the basal to distal inferoseptal wall(s) in the typical distribution of the RCA territory.    There are no other significant perfusion abnormalities.    The gated perfusion images showed an ejection fraction of 62% at rest. The gated perfusion images showed an ejection fraction of 73% post stress.    The patient reported no chest pain during the stress test.    There were no arrhythmias during stress.    The nuclear stress test is  fair quality.  On the nuclear stress test the EF is normal.  If the patient has an echo, the EF on the echo is considered more accurate.    The patient has a moderate to high risk nuclear stress test due to inferior and lateral reversible defect.    If clinically indicated, consider further evaluation with coronary angiogram.     Coronary Angiogram  No results found for this or any previous visit.     Holter Monitor  No cardiac monitor results found for the past 12 months    Last BMP BMP  Lab Results   Component Value Date     05/14/2024    K 4.2 05/14/2024     07/01/2021    CO2 26 05/14/2024    BUN 10.7 05/14/2024    CREATININE 0.86 05/14/2024    CALCIUM 9.3 05/14/2024    ANIONGAP 12 07/01/2021    EGFRNORACEVR >60 05/14/2024      Last CBC     Lab Results   Component Value Date    WBC 7.88  "05/14/2024    HGB 14.8 05/14/2024    HCT 44.6 05/14/2024    MCV 95.7 (H) 05/14/2024     05/14/2024           BNP  No results found for: "BNP"  Last lipids    Lab Results   Component Value Date    CHOL 140 03/14/2024    CHOL 78 08/12/2023    CHOL 98 (L) 03/29/2021    HDL 43 03/14/2024    HDL 23 (L) 08/12/2023    HDL 45 03/29/2021    LDL 76.00 03/14/2024    TRIG 107 03/14/2024    TRIG 51 08/12/2023    TRIG 81 03/29/2021    TOTALCHOLEST 3 03/14/2024    TOTALCHOLEST 2.2 03/29/2021    TOTALCHOLEST 3.3 07/08/2020      LFT     Lab Results   Component Value Date    ALT 14 03/14/2024    ALT 19 06/29/2021    ALT 33 03/29/2021    AST 12 03/14/2024    AST 6 (L) 06/29/2021    AST 16 03/29/2021       Assessment:     Patient Active Problem List   Diagnosis    Bipolar I disorder, severe, current or most recent episode depressed, with psychotic features    Seizure disorder    Subarachnoid hematoma    Other hyperlipidemia    Hypertension    Physical therapy evaluation, initial    Need for occupational therapy assessment    Hemiplegia of left nondominant side as late effect of cerebrovascular disease    Encounter for screening colonoscopy    Stable angina pectoris    Shortness of breath     10 Year Cardiovascular Risk:  The ASCVD Risk score ([facility], et al., 2019) failed to calculate for the following reasons:    The patient has a prior MI or stroke diagnosis  BMI:  Body mass index is 21.44 kg/m².      Last PCP visit:  4/16/2024    Plan:   LHC scheduled for today  Patient has been NPO  Consent signed     Khang Fletcher MD  Cardiology Attending     Future Appointments   Date Time Provider Department Center   5/28/2024  1:00 PM Salvador Dennis MD [facility]   6/17/2024 10:20 AM Shayna Rosa FNP [facility] [place]   10/23/2024  9:00 AM Niurka Calles MD [facility] NEURO [place]        "

## 2024-05-22 NOTE — DISCHARGE SUMMARY
[facility] - Cath Lab  Discharge Note  Short Stay    Procedure(s) (LRB):  Angiogram, Coronary, with Left Heart Cath (N/A)      Final Diagnosis:  successful outpatient coronary angiogram.   Outcome:  the patient tolerated the procedure well, and can be discharged.    Disposition:  discharge to home.   Follow-up:  in Cardiology Clinic in 1 month.  If the patient has any concerning symptoms, the patient should go to the Emergency Department.

## 2024-05-28 ENCOUNTER — OFFICE VISIT (OUTPATIENT)
Dept: BEHAVIORAL HEALTH | Facility: CLINIC | Age: 54
End: 2024-05-28
Payer: MEDICAID

## 2024-05-28 VITALS
WEIGHT: 165.19 LBS | SYSTOLIC BLOOD PRESSURE: 138 MMHG | DIASTOLIC BLOOD PRESSURE: 84 MMHG | OXYGEN SATURATION: 98 % | BODY MASS INDEX: 21.21 KG/M2 | HEART RATE: 80 BPM | TEMPERATURE: 98 F

## 2024-05-28 DIAGNOSIS — G47.00 INSOMNIA, UNSPECIFIED TYPE: ICD-10-CM

## 2024-05-28 DIAGNOSIS — F31.5 BIPOLAR I DISORDER, SEVERE, CURRENT OR MOST RECENT EPISODE DEPRESSED, WITH PSYCHOTIC FEATURES: Primary | ICD-10-CM

## 2024-05-28 PROCEDURE — 99214 OFFICE O/P EST MOD 30 MIN: CPT | Mod: PBBFAC,PN | Performed by: STUDENT IN AN ORGANIZED HEALTH CARE EDUCATION/TRAINING PROGRAM

## 2024-05-28 PROCEDURE — 3044F HG A1C LEVEL LT 7.0%: CPT | Mod: CPTII,,, | Performed by: STUDENT IN AN ORGANIZED HEALTH CARE EDUCATION/TRAINING PROGRAM

## 2024-05-28 PROCEDURE — 3008F BODY MASS INDEX DOCD: CPT | Mod: CPTII,,, | Performed by: STUDENT IN AN ORGANIZED HEALTH CARE EDUCATION/TRAINING PROGRAM

## 2024-05-28 PROCEDURE — 1159F MED LIST DOCD IN RCRD: CPT | Mod: CPTII,,, | Performed by: STUDENT IN AN ORGANIZED HEALTH CARE EDUCATION/TRAINING PROGRAM

## 2024-05-28 PROCEDURE — 1160F RVW MEDS BY RX/DR IN RCRD: CPT | Mod: CPTII,,, | Performed by: STUDENT IN AN ORGANIZED HEALTH CARE EDUCATION/TRAINING PROGRAM

## 2024-05-28 PROCEDURE — 3075F SYST BP GE 130 - 139MM HG: CPT | Mod: CPTII,,, | Performed by: STUDENT IN AN ORGANIZED HEALTH CARE EDUCATION/TRAINING PROGRAM

## 2024-05-28 PROCEDURE — 99205 OFFICE O/P NEW HI 60 MIN: CPT | Mod: AF,HB,S$PBB, | Performed by: STUDENT IN AN ORGANIZED HEALTH CARE EDUCATION/TRAINING PROGRAM

## 2024-05-28 PROCEDURE — 3079F DIAST BP 80-89 MM HG: CPT | Mod: CPTII,,, | Performed by: STUDENT IN AN ORGANIZED HEALTH CARE EDUCATION/TRAINING PROGRAM

## 2024-05-28 RX ORDER — TRAZODONE HYDROCHLORIDE 50 MG/1
TABLET ORAL
Qty: 60 TABLET | Refills: 5 | Status: SHIPPED | OUTPATIENT
Start: 2024-05-28

## 2024-05-28 RX ORDER — ARIPIPRAZOLE 15 MG/1
15 TABLET ORAL DAILY
Qty: 30 TABLET | Refills: 5 | Status: SHIPPED | OUTPATIENT
Start: 2024-05-28

## 2024-05-28 NOTE — PROGRESS NOTES
"Outpatient Psychiatry Initial Visit    5/28/2024    Marvin Leslie, a 54 y.o. male, presenting for initial evaluation visit. Met with patient.    Reason for Encounter:   Referred from: Shayna Rosa NP  Reason for referral: "Bipolar I disorder, severe, current or most recent episode depressed, with psychotic features"  Chief complaint: bipolar disorder x years    History of Present Illness:   Pt is a 55yo M w/ PPHx of bipolar disorder  who presents to psychiatry clinic for evaluation.      Pt notes history of psychiatric treatment, last seen yrs ago.  Has been diagnosed with bipolar, schizophrenia, PTSD, heavy substance use.  First use of substances as an early teenager.  Started with alcohol and then progressed to cannabis.  Eventually used cocaine and meth.  Also notes history of acid.  Last use of recreational substances several years ago.  Notes brain aneurysms inspired pt's desire to quit substance use.  Notes numerous rehabilitation admission ([place], [place], [place], [place]).  Drug of choice meth and cocaine.  Notes he found substance use helpful for energy, alcohol helpful for mood.  Notes he lived a very active, hard working lifestyle and substance helped him accomplish.  Denies recent cravings for substance use.      Notes history of complicated neurological history.  Notes long history of migraine headaches.    Said he had ruptured brain aneurysm, says "I woke [place]," needed neurosurgical intervention.  Says he was in a coma "for days."  Hospitalized "for a long time."  Notes 18 months later, another aneurysm ruptured.  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  Notes neurosurgery x3.  Notes residual weakness of LUE and LLE, notes sensation change of L hand.  Notes sensitivity of skin of head.  Uses a cane to assist with ambulation.      Regarding depression, pt endorses history of depressive episodes.  Endorses currently feeling depressed.  Regarding historical depressive episodes, " "episodes usually last weeks to months in duration.  Episodes are usually associated with identifiable triggers, attributes depression to ongoing health problems and functional limitations.  Depressive mood associated with decreased appetite, poor sleep, poor concentration, poor energy, + anhedonia, poor motivation, +irritability, + hopelessness.  Endorses history of suicidal thoughts (occurred in last month), endorses history of suicide attempts (2x, tried to hang self, [facility]).      Endorses history of episodes concerning for robles/hypomania.  Notes euphoric mood, decreased need for sleep, +racing thoughts, impulsive behavior, increased activity, increased talkativeness.  Notes periods last >4 days duration, occurred after stopping substance use.      Endorses history of hallucinations or other altered perceptions (AH of voices, VH of seeing "flashes of light"), endorses paranoid ideation.      Endorses excess worry/anxiety.  Denies growing up with excessive anxiety.  Worries are mostly about ongoing life stressors.  Notes associated symptoms: + rumination, + sleep difficulty, poor concentration, + irritability, + tension or feeling "on edge," denies associated muscle tension,  HA, or GI upset.     Meds Hx (has pt taken the following):   Zoloft, abilify, elavil    History:     Allergies:  Patient has no known allergies.    Past Medical/Surgical History:  Past Medical History:   Diagnosis Date    Aneurysm     Asthma     Coronary artery disease involving coronary bypass graft     Depression     Hyperlipidemia 07/08/2020    Hypertension     Kidney stone     PTSD (post-traumatic stress disorder)     Stroke      Past Surgical History:   Procedure Laterality Date    ANGIOGRAM, CORONARY, WITH LEFT HEART CATHETERIZATION N/A 5/22/2024    Procedure: Angiogram, Coronary, with Left Heart Cath;  Surgeon: Khang Fletcher MD;  Location: [facility] CATH LAB;  Service: Cardiology;  Laterality: N/A;    BYPASS OF MESENTERIC ARTERY      " FRACTURE SURGERY      REPAIR OF ANEURYSM      (2.)2016 and 2018     Medications  Outpatient Encounter Medications as of 5/28/2024   Medication Sig Dispense Refill    albuterol (PROVENTIL HFA) 90 mcg/actuation inhaler Inhale 2 puffs into the lungs every 6 (six) hours as needed for Wheezing or Shortness of Breath. 8.5 g 1    aspirin 81 MG Chew Chew and swallow 1 tablet by mouth once daily. 90 tablet 3    atorvastatin (LIPITOR) 40 MG tablet Take 1 tablet (40 mg total) by mouth once daily. 90 tablet 3    metoprolol succinate (TOPROL-XL) 25 MG 24 hr tablet Take 1 tablet by mouth once daily. 90 tablet 3    pantoprazole (PROTONIX) 40 MG tablet Take 1 tablet by mouth once daily 30 tablet 2    tamsulosin (FLOMAX) 0.4 mg Cap Take 1 capsule by mouth.      ARIPiprazole (ABILIFY) 15 MG Tab Take 1 tablet (15 mg total) by mouth once daily. 30 tablet 5    cholecalciferol, vitamin D3, 1,250 mcg (50,000 unit) capsule Take 1 capsule (50,000 Units total) by mouth every 7 days. 12 capsule 0    nitroGLYCERIN (NITROSTAT) 0.4 MG SL tablet Place 1 tablet (0.4 mg total) under the tongue every 5 (five) minutes as needed for Chest pain. 30 tablet 2    traZODone (DESYREL) 50 MG tablet Take 50-100mg (1-2 tablets) by mouth nightly as needed for insomnia. 60 tablet 5    [DISCONTINUED] amitriptyline (ELAVIL) 25 MG tablet Take 1 tablet (25 mg total) by mouth every evening. (Patient not taking: Reported on 5/28/2024) 30 tablet 2    [DISCONTINUED] ARIPiprazole (ABILIFY) 20 MG Tab Take 1 tablet (20 mg total) by mouth once daily. (Patient not taking: Reported on 5/28/2024) 90 tablet 3     Facility-Administered Encounter Medications as of 5/28/2024   Medication Dose Route Frequency Provider Last Rate Last Admin    albuterol sulfate nebulizer solution 2.5 mg  2.5 mg Nebulization Q4H PRN Shayna Rosa, FNP   2.5 mg at 04/22/24 1001    regadenoson injection 0.4 mg  0.4 mg Intravenous 1 time in Clinic/HOD Karen Patterson MD         Past Psychiatric  History:  Previous Medication Trials: See above   Previous Psychiatric Hospitalizations: yes, 1x   Previous Suicide Attempts: yes, see above   History of Violence: None in past 6 months  Outpatient mental health: last psychiatrist in [place]  Family History: father with schizophrenia    Social History:  Marital Status: not currently in dating relationship  Children: 2 step children  Employment Status/Info: not working currently, trying to get disability  Education: HS grad, some college  Housing Status: lives in house with brother and sister in law  History of phys/sexual abuse: denies  Access to gun: denies    Substance Abuse History:  Tobacco Use: former  Use of Alcohol: former  Recreational Drugs: see former  Rehab/detox: denies    Legal History:  Past Charges/Incarcerations: DWI x2   Pending charges: denies     Psychosocial Stressors: family, financial, and health    Review Of Systems:     Constitutional: denies fevers, denies chills, denies recent weight change  Eyes: denies pain in eyes or loss of vision  Ears: denies tinnitis, denies loss of hearing  Mouth/throat: denies difficulty with speaking, denies difficulty with swallowing  Cardiac: denies CP, denies palpitations  Respiratory: denies SOB, denies cough  Gastrointestinal: denies abdominal pain, denies nausea/vomiting, denies constipation/diarrhea  Genitourinary: denies urinary frequency, denies burning on urination  Dermatologic: denies rash, denies erythema  Musculoskeletal: denies myalgias, + arthralgias (neck)  Hematologic: denies easy bleeding/bruising, denies enlarged lymph nodes  Neurologic: denies seizures, + headaches, + loss of sensation, +weakness LUE, LLE  Psychiatric: see HPI    Current Evaluation:     Nutritional Screening: Considering the patient's height and weight, medications, medical history and preferences, should a referral be made to the dietitian? no    Constitutional  Vitals:  Most recent vital signs, dated less than 90 days  "prior to this appointment, were reviewed.      Vitals:    05/28/24 1306 05/28/24 1308   BP: (!) 137/95 138/84   Pulse: 80 80   Temp: 98 °F (36.7 °C)    SpO2: 98%    Weight: 74.9 kg (165 lb 3.2 oz)       General:  No acute distress     Neurologic:   Motor: limited ROM of RUE, no tremors  Gait: walks with cane    Mental status examination:  Appearance: unremarkable, age appropriate  Level of Consciousness: awake and alert  Behavior/Cooperation: cooperative, restless and fidgety   Psychomotor: unremarkable  Speech: normal tone, normal rate, normal pitch, normal volume  Language: english, fluid  Memory: Registers 3/3 objects, recalls 2/3 objects at 5 minutes without cuing, recalls 3/3 objects at 5 minutes with cuing  Orientation: grossly intact, person, place, situation, day of week, month of year, year  Mood: "on edge"  Affect: mood congruent, constricted, and anxious-appearing  Attention Span/Concentration: intact to interview and spells "WORLD" forwards and backwards without error  Thought Process: linear, goal-directed  Thought Content: denies SI/HI/paranoia, no delusional ideation volunteered, denies plan or desire for self harm or harm to others  Perceptions: denies hallucinations or other altered perceptions  Associations: Logical and appropriate  Fund of Knowledge: appropriate for education  Abstraction: similarities were abstract  Insight: good  Judgment: good    Relevant Elements of Neurological Exam: uses a cane    Functioning in Relationships:  Spouse/partner: not in dating relationship  Peers: socially isolated  Employers: not working currently    Assessments:   PHQ9:       5/28/2024   PHQ-9 Depression Patient Health Questionnaire   Over the last two weeks how often have you been bothered by little interest or pleasure in doing things 3   Over the last two weeks how often have you been bothered by feeling down, depressed or hopeless 3   Over the last two weeks how often have you been bothered by trouble " falling or staying asleep, or sleeping too much 3   Over the last two weeks how often have you been bothered by feeling tired or having little energy 3   Over the last two weeks how often have you been bothered by a poor appetite or overeating 3   Over the last two weeks how often have you been bothered by feeling bad about yourself - or that you are a failure or have let yourself or your family down 3   Over the last two weeks how often have you been bothered by trouble concentrating on things, such as reading the newspaper or watching television 3   Over the last two weeks how often have you been bothered by moving or speaking so slowly that other people could have noticed. 3   Over the last two weeks how often have you been bothered by thoughts that you would be better off dead, or of hurting yourself 3   If you checked off any problems, how difficult have these problems made it for you to do your work, take care of things at home or get along with other people? Extremely difficult   PHQ-9 Score 27      GAD7:       5/28/2024     1:00 PM   GAD7   1. Feeling nervous, anxious, or on edge? 3   2. Not being able to stop or control worrying? 3   3. Worrying too much about different things? 3   4. Trouble relaxing? 3   5. Being so restless that it is hard to sit still? 3   6. Becoming easily annoyed or irritable? 3   7. Feeling afraid as if something awful might happen? 3   8. If you checked off any problems, how difficult have these problems made it for you to do your work, take care of things at home, or get along with other people? 3   LISA-7 Score 21     Laboratory Data  Clinical Support on 05/14/2024   Component Date Value Ref Range Status    QRS Duration 05/14/2024 88  ms Final    [facility] QTC Calculation 05/14/2024 434  ms Final    Sodium 05/14/2024 140  136 - 145 mmol/L Final    Potassium 05/14/2024 4.2  3.5 - 5.1 mmol/L Final    Chloride 05/14/2024 108 (H)  98 - 107 mmol/L Final    CO2 05/14/2024 26  22 - 29 mmol/L  Final    Glucose 05/14/2024 82  74 - 100 mg/dL Final    Blood Urea Nitrogen 05/14/2024 10.7  8.4 - 25.7 mg/dL Final    Creatinine 05/14/2024 0.86  0.73 - 1.18 mg/dL Final    BUN/Creatinine Ratio 05/14/2024 12   Final    Calcium 05/14/2024 9.3  8.4 - 10.2 mg/dL Final    Anion Gap 05/14/2024 6.0  mEq/L Final    eGFR 05/14/2024 >60  mL/min/1.73/m2 Final    PT 05/14/2024 13.2  11.4 - 14.0 seconds Final    INR 05/14/2024 1.0  <=1.3 Final    PTT 05/14/2024 28.9  23.2 - 33.7 seconds Final    WBC 05/14/2024 7.88  4.50 - 11.50 x10(3)/mcL Final    RBC 05/14/2024 4.66 (L)  4.70 - 6.10 x10(6)/mcL Final    Hgb 05/14/2024 14.8  14.0 - 18.0 g/dL Final    Hct 05/14/2024 44.6  42.0 - 52.0 % Final    MCV 05/14/2024 95.7 (H)  80.0 - 94.0 fL Final    MCH 05/14/2024 31.8 (H)  27.0 - 31.0 pg Final    MCHC 05/14/2024 33.2  33.0 - 36.0 g/dL Final    RDW 05/14/2024 12.0  11.5 - 17.0 % Final    Platelet 05/14/2024 262  130 - 400 x10(3)/mcL Final    MPV 05/14/2024 9.6  7.4 - 10.4 fL Final    Neut % 05/14/2024 76.1  % Final    Lymph % 05/14/2024 14.7  % Final    Mono % 05/14/2024 5.8  % Final    Eos % 05/14/2024 2.2  % Final    Basophil % 05/14/2024 0.9  % Final    Lymph # 05/14/2024 1.16  0.6 - 4.6 x10(3)/mcL Final    Neut # 05/14/2024 6.00  2.1 - 9.2 x10(3)/mcL Final    Mono # 05/14/2024 0.46  0.1 - 1.3 x10(3)/mcL Final    Eos # 05/14/2024 0.17  0 - 0.9 x10(3)/mcL Final    Baso # 05/14/2024 0.07  <=0.2 x10(3)/mcL Final    IG# 05/14/2024 0.02  0 - 0.04 x10(3)/mcL Final    IG% 05/14/2024 0.3  % Final    NRBC% 05/14/2024 0.0  % Final     Assessment - Diagnosis - Goals:     Marvin Leslie, a 54 y.o. male, presenting for initial evaluation visit.     Impression:       ICD-10-CM ICD-9-CM   1. Bipolar I disorder, severe, current or most recent episode depressed, with psychotic features  F31.5 296.54   2. Insomnia, unspecified type  G47.00 780.52     Strengths and Liabilities: Strength: Patient accepts guidance/feedback, Strength: Patient is  expressive/articulate., Strength: Patient is intelligent., Liability: Patient lacks coping skills.    Treatment Goals:  Specify outcomes written in observable, behavioral terms:   Depression: increasing energy, increasing interest in usual activities, increasing motivation, and reducing fatigue    Treatment Plan/Recommendations:   Restart abilify at 20mg daily, Discussed potential SE including but not limited to sedation, metabolic disturbance, weight gain, movement disorder (including TD)  Start trazodone 50-100mg nightly prn insomnia, discussed potential SE including but not limited to sedation, suicidal thinking/behavior, dry mouth  Recent labwork in EMR reviewed, hba1c and FLP wnl  AIMS difficulty to assess given residual neurological symptoms  Defer management of amitriptyline to pt's other providers  No need for PEC as pt is not an imminent danger to self or others or gravely disabled due to acute psychiatric illness  Discussed that pt should either call clinic for psychiatric crisis symptoms or present to nearest emergency room    Discussed with patient informed consent including diagnosis, risks and benefits of proposed treatment above vs. alternative treatments vs. no treatment, as well as serious and common side effects of these treatments, and the inherent unpredictability of individual responses to these treatments. The patient expresses understanding of the above and displays the capacity to agree with this current plan. Patient also agrees that, currently, the benefits outweigh the risks and would like to pursue treatment at this time, and had no other questions.    Instructions:  Take all medications as prescribed.    Abstain from recreational drugs and alcohol.  Present to ED or call 911 for SI/HI plan or intent, psychosis, or medical emergency.    Return to Clinic: Follow up in about 4 weeks (around 6/25/2024).    Total time:   Complexity (level) of medical decision making employed in the encounter:  HIGH    The total time for services performed on the date of the encounter (including review of prior visit notes, review of notes from other providers, review of results from laboratory/imaging studies, face-to-face time with patient, and time spent on other activities directly related to patient care): 60 minutes.    Salvador Dennis MD  [facility]

## 2024-05-29 DIAGNOSIS — R06.02 SHORTNESS OF BREATH: ICD-10-CM

## 2024-05-29 RX ORDER — ALBUTEROL SULFATE 90 UG/1
AEROSOL, METERED RESPIRATORY (INHALATION)
Qty: 8.5 G | Refills: 1 | Status: SHIPPED | OUTPATIENT
Start: 2024-05-29

## 2024-06-03 DIAGNOSIS — Z76.0 MEDICATION REFILL: ICD-10-CM

## 2024-06-03 RX ORDER — PANTOPRAZOLE SODIUM 40 MG/1
40 TABLET, DELAYED RELEASE ORAL DAILY
Qty: 30 TABLET | Refills: 2 | Status: SHIPPED | OUTPATIENT
Start: 2024-06-03 | End: 2024-06-17 | Stop reason: SDUPTHER

## 2024-06-10 ENCOUNTER — TELEPHONE (OUTPATIENT)
Dept: BEHAVIORAL HEALTH | Facility: CLINIC | Age: 54
End: 2024-06-10
Payer: MEDICAID

## 2024-06-10 NOTE — TELEPHONE ENCOUNTER
----- Message from Padmini Cohen sent at 6/7/2024  2:34 PM CDT -----  Regarding: pt rq call back  Pt left  for a callback from melvina regarding his medication, pt didn't disclose much detail in message

## 2024-06-17 ENCOUNTER — OFFICE VISIT (OUTPATIENT)
Dept: INTERNAL MEDICINE | Facility: CLINIC | Age: 54
End: 2024-06-17
Payer: MEDICAID

## 2024-06-17 VITALS
SYSTOLIC BLOOD PRESSURE: 128 MMHG | RESPIRATION RATE: 16 BRPM | HEIGHT: 74 IN | BODY MASS INDEX: 21.95 KG/M2 | HEART RATE: 97 BPM | WEIGHT: 171.06 LBS | TEMPERATURE: 98 F | DIASTOLIC BLOOD PRESSURE: 82 MMHG

## 2024-06-17 DIAGNOSIS — E78.49 OTHER HYPERLIPIDEMIA: ICD-10-CM

## 2024-06-17 DIAGNOSIS — G25.81 RLS (RESTLESS LEGS SYNDROME): ICD-10-CM

## 2024-06-17 DIAGNOSIS — Z76.0 MEDICATION REFILL: ICD-10-CM

## 2024-06-17 DIAGNOSIS — E55.9 VITAMIN D DEFICIENCY: ICD-10-CM

## 2024-06-17 DIAGNOSIS — G40.909 SEIZURE DISORDER: ICD-10-CM

## 2024-06-17 DIAGNOSIS — R56.9 NOCTURNAL SEIZURE: ICD-10-CM

## 2024-06-17 DIAGNOSIS — K21.9 GASTROESOPHAGEAL REFLUX DISEASE, UNSPECIFIED WHETHER ESOPHAGITIS PRESENT: ICD-10-CM

## 2024-06-17 DIAGNOSIS — Z86.79 HISTORY OF CEREBRAL ANEURYSM: ICD-10-CM

## 2024-06-17 DIAGNOSIS — Z87.891 FORMER SMOKER: ICD-10-CM

## 2024-06-17 DIAGNOSIS — G43.909 MIGRAINE WITHOUT STATUS MIGRAINOSUS, NOT INTRACTABLE, UNSPECIFIED MIGRAINE TYPE: ICD-10-CM

## 2024-06-17 PROCEDURE — 1160F RVW MEDS BY RX/DR IN RCRD: CPT | Mod: CPTII,,, | Performed by: NURSE PRACTITIONER

## 2024-06-17 PROCEDURE — 3008F BODY MASS INDEX DOCD: CPT | Mod: CPTII,,, | Performed by: NURSE PRACTITIONER

## 2024-06-17 PROCEDURE — 3074F SYST BP LT 130 MM HG: CPT | Mod: CPTII,,, | Performed by: NURSE PRACTITIONER

## 2024-06-17 PROCEDURE — 99215 OFFICE O/P EST HI 40 MIN: CPT | Mod: PBBFAC | Performed by: NURSE PRACTITIONER

## 2024-06-17 PROCEDURE — 99214 OFFICE O/P EST MOD 30 MIN: CPT | Mod: S$PBB,,, | Performed by: NURSE PRACTITIONER

## 2024-06-17 PROCEDURE — 3079F DIAST BP 80-89 MM HG: CPT | Mod: CPTII,,, | Performed by: NURSE PRACTITIONER

## 2024-06-17 PROCEDURE — 1159F MED LIST DOCD IN RCRD: CPT | Mod: CPTII,,, | Performed by: NURSE PRACTITIONER

## 2024-06-17 PROCEDURE — 3044F HG A1C LEVEL LT 7.0%: CPT | Mod: CPTII,,, | Performed by: NURSE PRACTITIONER

## 2024-06-17 RX ORDER — AMITRIPTYLINE HYDROCHLORIDE 25 MG/1
25 TABLET, FILM COATED ORAL
COMMUNITY
Start: 2024-06-10 | End: 2024-06-17

## 2024-06-17 RX ORDER — PANTOPRAZOLE SODIUM 40 MG/1
40 TABLET, DELAYED RELEASE ORAL DAILY
Qty: 90 TABLET | Refills: 1 | Status: SHIPPED | OUTPATIENT
Start: 2024-06-17

## 2024-06-17 RX ORDER — NITROGLYCERIN 0.4 MG/1
0.4 TABLET SUBLINGUAL EVERY 5 MIN PRN
Qty: 30 TABLET | Refills: 3 | Status: SHIPPED | OUTPATIENT
Start: 2024-06-17 | End: 2025-06-17
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
## 2024-06-26 ENCOUNTER — HOSPITAL ENCOUNTER (OUTPATIENT)
Dept: RADIOLOGY | Facility: HOSPITAL | Age: 54
Discharge: HOME | End: 2024-06-26
Attending: NURSE PRACTITIONER
Payer: MEDICAID

## 2024-06-26 DIAGNOSIS — Z86.79 HISTORY OF CEREBRAL ANEURYSM: ICD-10-CM

## 2024-06-26 DIAGNOSIS — G43.909 MIGRAINE WITHOUT STATUS MIGRAINOSUS, NOT INTRACTABLE, UNSPECIFIED MIGRAINE TYPE: ICD-10-CM

## 2024-06-26 DIAGNOSIS — G40.909 SEIZURE DISORDER: ICD-10-CM

## 2024-06-26 PROCEDURE — 70450 CT HEAD/BRAIN W/O DYE: CPT | Mod: TC

## 2024-06-27 ENCOUNTER — OFFICE VISIT (OUTPATIENT)
Dept: CARDIOLOGY | Facility: CLINIC | Age: 54
End: 2024-06-27
Payer: MEDICAID

## 2024-06-27 VITALS
RESPIRATION RATE: 18 BRPM | OXYGEN SATURATION: 99 % | HEIGHT: 74 IN | SYSTOLIC BLOOD PRESSURE: 97 MMHG | TEMPERATURE: 98 F | HEART RATE: 91 BPM | BODY MASS INDEX: 20.92 KG/M2 | DIASTOLIC BLOOD PRESSURE: 67 MMHG | WEIGHT: 163 LBS

## 2024-06-27 DIAGNOSIS — I73.9 PAD (PERIPHERAL ARTERY DISEASE): Primary | ICD-10-CM

## 2024-06-27 PROCEDURE — 99214 OFFICE O/P EST MOD 30 MIN: CPT | Mod: PBBFAC | Performed by: INTERNAL MEDICINE

## 2024-06-27 NOTE — PROGRESS NOTES
06/27/2024 8:23 AM    Subjective:     CHIEF COMPLAINT:   Chief Complaint   Patient presents with    [facility] F/U                           HPI:    Mr. Marvin Leslie is a 54 y.o. male PMHx multiple hemorrhagic CVAs and arterial aneurysms s/p clipping and M2 aneurysm bypass using a SVG graft.  The patient previously reported a coronary artery bypass surgery but since last visit records have been obtained from [place], [place] and the bypass of the brain aneurysm is the only major surgery he has had. He does not know if he was ever evaluated for vasculitis.  No history of CAD per outside records.  Currently taking asa, statin, beta blocker. Patient reports improved chest pain. States that he does have episodes of chest pain during high level of exertion. No chest pain reported at rest. Denies associated symptoms with chest pain. He does report bilateral LE pain that is worse at night.     Past Medical History    Patient Active Problem List   Diagnosis    Bipolar I disorder, severe, current or most recent episode depressed, with psychotic features    Seizure disorder    Subarachnoid hematoma    Other hyperlipidemia    Hypertension    Physical therapy evaluation, initial    Need for occupational therapy assessment    Hemiplegia of left nondominant side as late effect of cerebrovascular disease    Encounter for screening colonoscopy    Stable angina pectoris    Shortness of breath       Surgical History    Past Surgical History:   Procedure Laterality Date    ANGIOGRAM, CORONARY, WITH LEFT HEART CATHETERIZATION N/A 5/22/2024    Procedure: Angiogram, Coronary, with Left Heart Cath;  Surgeon: Khang Fletcher MD;  Location: [facility] CATH LAB;  Service: Cardiology;  Laterality: N/A;    BYPASS OF MESENTERIC ARTERY      FRACTURE SURGERY      REPAIR OF ANEURYSM      (2.)2016 and 2018       Social History    Social History     Socioeconomic History    Marital status:     Number of children: 0   Occupational History     Occupation: Unemployed   Tobacco Use    Smoking status: Former     Current packs/day: 0.00     Average packs/day: 1 pack/day for 34.0 years (34.0 ttl pk-yrs)     Types: Cigarettes     Start date: 11/3/1987     Quit date: 11/3/2021     Years since quittin.6    Smokeless tobacco: Never   Substance and Sexual Activity    Alcohol use: Not Currently    Drug use: Not Currently     Comment: past     Social Determinants of Health     Financial Resource Strain: Patient Declined (3/14/2024)    Overall Financial Resource Strain (CARDIA)     Difficulty of Paying Living Expenses: Patient declined   Food Insecurity: Patient Declined (3/14/2024)    Hunger Vital Sign     Worried About Running Out of Food in the Last Year: Patient declined     Ran Out of Food in the Last Year: Patient declined   Transportation Needs: Patient Declined (3/14/2024)    PRAPARE - Transportation     Lack of Transportation (Medical): Patient declined     Lack of Transportation (Non-Medical): Patient declined   Physical Activity: Patient Declined (3/14/2024)    Exercise Vital Sign     Days of Exercise per Week: Patient declined     Minutes of Exercise per Session: Patient declined   Stress: Patient Declined (3/14/2024)    [employer] [place] [employer] - Occupational Stress Questionnaire     Feeling of Stress : Patient declined   Housing Stability: Patient Declined (3/14/2024)    Housing Stability Vital Sign     Unable to Pay for Housing in the Last Year: Patient declined     Unstable Housing in the Last Year: Patient declined       Family History    Family History   Problem Relation Name Age of Onset    Cancer Father         Allergy  Review of patient's allergies indicates:  No Known Allergies    Current Medications    Current Outpatient Medications:     albuterol (PROVENTIL/VENTOLIN HFA) 90 mcg/actuation inhaler, INHALE TWO PUFFS BY MOUTH EVERY 6 HOURS AS NEEDED FOR WHEEZING OR SHORTNESS OF BREATH., Disp: 8.5 g, Rfl: 1    atorvastatin  (LIPITOR) 40 MG tablet, Take 1 tablet (40 mg total) by mouth once daily., Disp: 90 tablet, Rfl: 3    cholecalciferol, vitamin D3, 1,250 mcg (50,000 unit) capsule, Take 1 capsule (50,000 Units total) by mouth every 7 days., Disp: 12 capsule, Rfl: 0    metoprolol succinate (TOPROL-XL) 25 MG 24 hr tablet, Take 1 tablet by mouth once daily., Disp: 90 tablet, Rfl: 3    nitroGLYCERIN (NITROSTAT) 0.4 MG SL tablet, Place 1 tablet (0.4 mg total) under the tongue every 5 (five) minutes as needed for Chest pain., Disp: 30 tablet, Rfl: 3    pantoprazole (PROTONIX) 40 MG tablet, Take 1 tablet (40 mg total) by mouth once daily., Disp: 90 tablet, Rfl: 1    tamsulosin (FLOMAX) 0.4 mg Cap, Take 1 capsule by mouth., Disp: , Rfl:     traZODone (DESYREL) 50 MG tablet, Take 50-100mg (1-2 tablets) by mouth nightly as needed for insomnia., Disp: 60 tablet, Rfl: 5    ARIPiprazole (ABILIFY) 15 MG Tab, Take 1 tablet (15 mg total) by mouth once daily. (Patient not taking: Reported on 6/17/2024), Disp: 30 tablet, Rfl: 5    Current Facility-Administered Medications:     albuterol sulfate nebulizer solution 2.5 mg, 2.5 mg, Nebulization, Q4H PRN, Shayna Rosa, FNP, 2.5 mg at 04/22/24 1001    regadenoson injection 0.4 mg, 0.4 mg, Intravenous, 1 time in Clinic/HOD, Karen Patterson MD    ROS:   Please see HPI                                                                                                                                                                            CONSTITUTIONAL:    No fever.  No night sweats.  RESPIRATORY:  No cough.  No hemoptysis.  No wheezing.  SKIN:  No poor wound healing.  No rash.  CARDIOVASCULAR:  No claudication.  No cyanosis.     HEMATOLOGY:   No adenopathy.  No easy bruising.   MUSCULOSKELETAL:  No muscle cramp.  No muscle weakness.  GASTROENTEROLOGY:  No heart burn.  No melena.    NEUROLOGIC:  No dizziness.  No generalized weakness.     Objective:     PE:  Blood pressure 97/67, pulse 91, temperature  "98.1 °F (36.7 °C), resp. rate 18, height 6' 1.62" (1.87 m), weight 73.9 kg (163 lb), SpO2 99%.   BP Readings from Last 3 Encounters:   06/27/24 97/67   06/17/24 128/82   05/28/24 138/84       Wt Readings from Last 3 Encounters:   06/27/24 73.9 kg (163 lb)   06/17/24 77.6 kg (171 lb 1.2 oz)   05/28/24 74.9 kg (165 lb 3.2 oz)     BMI 21.14 kg/m^2    GENERAL:   NAD  CONSTITUTIONAL:  No acute distress.  Not ill appearing.  NECK:  No cervical adenopathy.  No carotid bruit.  CARDIOVASCULAR:  Normal rate.  Regular rhythm.  No murmur.  PULMONARY:  No respiratory distress.  No wheezing.  No crackles.  ABDOMINAL:  No distention.  No tenderness.  MUSCULOSKELETAL:  No deformity.  No edema.  SKIN:  No bruising.  No rash.  NEUROLOGICAL:  Oriented x 3.  Residual left sided deficits                                                                                                                                                                                                                                                                                                                                                                                                                                                                             CARDIAC TESTING:  EKG  No results found for this or any previous visit.  Echocardiogram  Results for orders placed during the hospital encounter of 07/20/23    Echo    Interpretation Summary  · The left ventricle is normal in size with mild concentric hypertrophy and normal systolic function.  · Normal left ventricular diastolic function.  · The estimated ejection fraction is 60%.  · Normal right ventricular size with normal right ventricular systolic function.  · Normal central venous pressure (3 mmHg).  · The estimated PA systolic pressure is 28 mmHg.    Stress Test 4/8/24    Abnormal myocardial perfusion scan.    There are two significant perfusion abnormalities.    Perfusion Abnormality #1 - There " is a moderate to severe intensity, moderate sized, reversible perfusion abnormality that is consistent with ischemia in the basal to apical lateral apical wall(s) in the typical distribution of the LCX territory.    Perfusion Abnormality #2 - There is a moderate sized, moderate to severe intensity, reversible perfusion abnormality that is consistent with ischemia in the basal to distal inferoseptal wall(s) in the typical distribution of the RCA territory.    There are no other significant perfusion abnormalities.    The gated perfusion images showed an ejection fraction of 62% at rest. The gated perfusion images showed an ejection fraction of 73% post stress.    The patient reported no chest pain during the stress test.    There were no arrhythmias during stress.     Coronary Angiogram  No results found for this or any previous visit.       Last BMP  BMP  Lab Results   Component Value Date     05/14/2024    K 4.2 05/14/2024     (H) 05/14/2024    CO2 26 05/14/2024    BUN 10.7 05/14/2024    CREATININE 0.86 05/14/2024    CALCIUM 9.3 05/14/2024    ANIONGAP 12 07/01/2021    EGFRNORACEVR >60 05/14/2024     Last CBC     Lab Results   Component Value Date    WBC 7.88 05/14/2024    HGB 14.8 05/14/2024    HCT 44.6 05/14/2024    MCV 95.7 (H) 05/14/2024     05/14/2024         Last lipids    Lab Results   Component Value Date    CHOL 140 03/14/2024    CHOL 78 08/12/2023    CHOL 98 (L) 03/29/2021     Lab Results   Component Value Date    HDL 43 03/14/2024    HDL 23 (L) 08/12/2023    HDL 45 03/29/2021     Lab Results   Component Value Date    LDLCALC 45 08/12/2023    LDLCALC 37 (L) 03/29/2021    LDLCALC 72.0 07/08/2020     Lab Results   Component Value Date    TRIG 107 03/14/2024    TRIG 51 08/12/2023    TRIG 81 03/29/2021       Lab Results   Component Value Date    CHOLHDL 3.39 08/12/2023    CHOLHDL 45.9 03/29/2021    CHOLHDL 30.7 07/08/2020       Assessment:   53 year old male with PMHx of prior polysubstance  abuse, multiple hemorrhagic CVAs and ruptures brain aneurysms s/p SVG conduit.    -NABILA abnormal on 4/8  -Angiogram conducted on 5/2024 with no abnormalities of coronary arteries   -discontinue ASA at this time secondary to history of hemorrhagic CVA with no CAD present on angiogram  -obtain arterial LE studies to assess for PAD given complaint of leg pain with mildly diminished pedal pulses BL  - continue lipitor 40 mg, Toprol 25 mg  - PRN nitro for chest pain  - LDL 76   Geovanni Holman DO  [facility] PGY-1    Future Appointments   Date Time Provider Department Center   7/2/2024 12:30 PM EEG, [facility] EEG [facility]    7/3/2024  7:30 AM Salvador Dennis MD [facility]   10/17/2024  1:40 PM Shayna Rosa, [facility]TM[facility] [place]    10/23/2024  9:00 AM Niurka Calles MD [facility] NEURO [place]    12/27/2024 10:45 AM Monet Terrazas, ARNOLDO [facility] CARD [place]

## 2024-06-27 NOTE — PATIENT INSTRUCTIONS
[facility] :  normal coronaries     Reminder:  centralized scheduling will call you to schedule BURT*'s; which is scan for legs

## 2024-06-27 NOTE — PROGRESS NOTES
Cardiology Attending    I evaluated Marvin Leslie and discussed the patient's symptoms, findings, and management plan with the resident.  Please see the Cardiology note for details.

## 2024-07-01 RX ORDER — AMITRIPTYLINE HYDROCHLORIDE 25 MG/1
25 TABLET, FILM COATED ORAL NIGHTLY
Qty: 30 TABLET | Refills: 2 | OUTPATIENT
Start: 2024-07-01

## 2024-07-02 ENCOUNTER — PROCEDURE VISIT (OUTPATIENT)
Dept: NEUROLOGY | Facility: HOSPITAL | Age: 54
End: 2024-07-02
Attending: NURSE PRACTITIONER
Payer: MEDICAID

## 2024-07-02 DIAGNOSIS — Z86.79 HISTORY OF CEREBRAL ANEURYSM: ICD-10-CM

## 2024-07-02 DIAGNOSIS — G40.909 SEIZURE DISORDER: ICD-10-CM

## 2024-07-02 DIAGNOSIS — G43.909 MIGRAINE WITHOUT STATUS MIGRAINOSUS, NOT INTRACTABLE, UNSPECIFIED MIGRAINE TYPE: ICD-10-CM

## 2024-07-02 PROCEDURE — 95816 EEG AWAKE AND DROWSY: CPT

## 2024-07-02 NOTE — Clinical Note
Denys Corral, during the last EEG, he was on Topiramate. It appears that he is no longer on it. Would you mind putting him on Depakote 500 mg daily for his seizures if possible? Thanks.

## 2024-07-02 NOTE — PROCEDURES
ROUTINE EEG    Date/Time: 7/2/2024 12:30 PM    Performed by: Niurka Calles MD  Authorized by: Shayna Rosa FNP      ndication: seizure disorder    Clinical Information: This is a 53 year old male with history of right intracranial aneurysm s/p right crani with seizure disorder     Anticonvulsants: None    Recording Condition: This is a routine electroencephalogram performed in outpatient settings using [employer] software. Electroencephalogram leads were placed using a 10-20 placement system. The electroencephalogram is monitored in real time by a technologist and is of good technical quality for review.     Technique: Electroencephalogram leads were placed using a 10-20 placement system and electrode impedance was maintained below 10KOhms. Gee and seizure detection computer program was used for digital EEG analysis throughout the monitoring period, to screen the EEG in real time and liseth the data file with pointers to electrographic seizure and interictal discharges. Hyperventilation was not performed and Photic stimulation was not performed.     Description:   Background Symmetry- Mild Asymmetry: intermittent right temporal slowing with increased amplitude   Frequency - Beta and Alpha  Voltage - Normal   Continuity - Continuous  Anterior to Posterior Gradient - Present  Posterior Dominant Rhythm - 10 Hz   Reactivity - Unclear  State Changes - Present without sleep stage N2 transients   Breach Artifact - Present: right frontal.    Cyclic Alternating Pattern of Encephalopathy (CAPE) - Absent  Sporadic Epileptiform Discharges - Present: frequent right frontal spike and wave and polyspike and wave discharges with max at F4.   Rhythmic or Periodic Patterns (RPPs) - Present: multiple episodes of right temporal spike and wave activity at 5 Hz, lasting around 2.5-3 seconds. No clinical activity noted.     Electroclinical Seizures (ECSz): Absent  Electroclinical Status Epilepticus (ECSE):  Absent  Electrographical Seizures (Esz): Absent    Briefly Potentially Ictal Rhythmic Discharges (BIRDs): Absent  Ictal-Interictal Continuum (IIC): Absent    Conclusion: This is an abnormal routine awake and drowsy EEG with right frontal and right temporal epileptiform discharge and focal dysfunction with superimposing breach artifact.     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 There is however no ongoing seizure activity, nor is there any evidence of status epilepticus in this study. Clinical correlation is advised.

## 2024-07-03 ENCOUNTER — OFFICE VISIT (OUTPATIENT)
Dept: BEHAVIORAL HEALTH | Facility: CLINIC | Age: 54
End: 2024-07-03
Payer: MEDICAID

## 2024-07-03 VITALS
BODY MASS INDEX: 23.3 KG/M2 | OXYGEN SATURATION: 99 % | RESPIRATION RATE: 18 BRPM | TEMPERATURE: 98 F | DIASTOLIC BLOOD PRESSURE: 81 MMHG | HEART RATE: 72 BPM | HEIGHT: 73 IN | WEIGHT: 175.81 LBS | SYSTOLIC BLOOD PRESSURE: 118 MMHG

## 2024-07-03 DIAGNOSIS — G47.00 INSOMNIA, UNSPECIFIED TYPE: ICD-10-CM

## 2024-07-03 DIAGNOSIS — F33.2 SEVERE EPISODE OF RECURRENT MAJOR DEPRESSIVE DISORDER, WITHOUT PSYCHOTIC FEATURES: Primary | ICD-10-CM

## 2024-07-03 DIAGNOSIS — F41.9 ANXIETY: ICD-10-CM

## 2024-07-03 PROCEDURE — 99214 OFFICE O/P EST MOD 30 MIN: CPT | Mod: PBBFAC,PN | Performed by: STUDENT IN AN ORGANIZED HEALTH CARE EDUCATION/TRAINING PROGRAM

## 2024-07-03 RX ORDER — SERTRALINE HYDROCHLORIDE 25 MG/1
TABLET, FILM COATED ORAL
Qty: 7 TABLET | Refills: 0 | Status: SHIPPED | OUTPATIENT
Start: 2024-07-03

## 2024-07-03 RX ORDER — TRAZODONE HYDROCHLORIDE 100 MG/1
TABLET ORAL
Qty: 60 TABLET | Refills: 5 | Status: SHIPPED | OUTPATIENT
Start: 2024-07-03

## 2024-07-03 RX ORDER — SERTRALINE HYDROCHLORIDE 50 MG/1
TABLET, FILM COATED ORAL
Qty: 30 TABLET | Refills: 5 | Status: SHIPPED | OUTPATIENT
Start: 2024-07-03

## 2024-07-03 NOTE — PROGRESS NOTES
"Outpatient Psychiatry Follow-Up Visit    7/3/2024    Clinical Status of Patient:  Outpatient (Ambulatory)    Chief Complaint:  Marvin Leslie is a 54 y.o. male who presents today for follow-up of mood disorder. Patient last seen for initial evaluation on 5/28/2024. Met with patient.      Interval History and Content of Current Session:  Interim Events/Subjective Report/Content of Current Session:   Pt reports doing "about the same"  overall.  Notes he started abilify and notes increased anxiety and was pacing around a lot.  Stopped abilify about 1-1.5 wks ago.  Notes symptoms have returned to baseline.  Reports "terrible" mood, endorses currently feeling depressed, elevated anxiety.  Sleeping poor, some benefit from trazodone.  Appetite low, weight stable.  Energy low, motivation poor.  Endorses irritability, endorses hopelessness.  Endorses recent vague Si, none today.  Denies HI/AVH/paranoia, denies plan or desire for self harm or harm to others.   Pt voices desire to adjust regimen to address ongoing symptoms.      Psychiatric Review of Systems-is patient experiencing or having changes in  Integrated into HPI above.     Review of Systems   PSYCHIATRIC: Pertinant items are noted in the narrative.  CONSTITUTIONAL: No weight gain or loss.  MUSCULOSKELETAL: No pain or stiffness of the joints.  NEUROLOGIC: + headaches, + loss of sensation, +weakness LUE, LLE   CARDIAC: No CP, no palpitations  RESPIRATORY: No shortness of breath.  CARDIOVASCULAR: No tachycardia or chest pain.  GASTROINTESTINAL: No nausea, vomiting, pain, constipation or diarrhea.    Past Medical, Family and Social History: The patient's past medical, family and social history have been reviewed and updated as appropriate within the electronic medical record - see encounter notes.    Compliance: stopped abilify    Side effects: see above    Risk Parameters:  Patient reports suicidal ideation: vague SI, no intention or plan  Patient reports no " "homicidal ideation  Patient reports no self-injurious behavior  Patient reports no violent behavior    Exam (detailed: at least 9 elements; comprehensive: all 15 elements)   Constitutional  Vitals:  Most recent vital signs, dated less than 90 days prior to this appointment, were reviewed.     Vitals:    07/03/24 0726   BP: 118/81   Pulse: 72   Resp: 18   Temp: 97.7 °F (36.5 °C)   TempSrc: Oral   SpO2: 99%   Weight: 79.7 kg (175 lb 12.8 oz)   Height: 6' 1" (1.854 m)        General:   Constitutional: No acute distress, appears stated age, casually dressed    Neurologic:   Motor: no abnormal involuntary movements observed  Gait: +limp    Mental status examination:   Appearance: appears stated age, casually dressed, no acute distress  Behavior: unremarkable for situation, calm and cooperative  Mood: "not good"  Affect: mood congruent, constricted, and anxious-appearing  Thought process: linear and goal directed  Thought content: no plan or desire for self harm or harm to others, denies paranoia, no delusional ideation volunteered  Perceptions: denies hallucinations or other altered perceptions  Associations: appropriate for conversation  Orientation: oriented to day of week, month, year, location, and situation  Language: English, fluid  Attention: able to attend to interview  Insight: fair  Judgement: good    PHQ9:  Over the last two weeks how often have you been bothered by little interest or pleasure in doing things: 1  Over the last two weeks how often have you been bothered by feeling down, depressed or hopeless: 3  PHQ-2 Total Score: 4  PHQ-9 Score: 21  PHQ-9 Interpretation: Severe        7/3/2024     7:30 AM 5/28/2024     1:00 PM   GAD7   1. Feeling nervous, anxious, or on edge? 3 3   2. Not being able to stop or control worrying? 2 3   3. Worrying too much about different things? 3 3   4. Trouble relaxing? 3 3   5. Being so restless that it is hard to sit still? 3 3   6. Becoming easily annoyed or irritable? 3 3 "   7. Feeling afraid as if something awful might happen? 3 3   8. If you checked off any problems, how difficult have these problems made it for you to do your work, take care of things at home, or get along with other people? 3 3   LISA-7 Score 20 21     Assessment and Diagnosis   Status/Progress: Based on the examination today, the patient's problem(s) is/are adequately but not ideally controlled.  New problems have not been presented today.   Diagnostic uncertainty is complicating management of the primary condition.  Number of separate conditions addressed during today's visit: 3 (mood uncontrolled, anxiety uncontrolled, substance use well controlled) .  Are medication adjustments being made today: Yes.  Are referral(s) being ordered today: Yes.  Complexity (level) of medical decision making employed in the encounter: HIGH.  Current symptom burden (especially suicidal thinking) warrants escalation of level of care.  Will refer to IOP.    General Impression:    ICD-10-CM ICD-9-CM   1. Severe episode of recurrent major depressive disorder, without psychotic features  F33.2 296.33   2. Anxiety  F41.9 300.00   3. Insomnia, unspecified type  G47.00 780.52     Bipolar disorder questionable, will discontinue SSRI if treatment-emergent robles occurs.     Intervention/Counseling/Treatment Plan   Stop abilify due to SE  Increase trazodone to 100-200mg nightly prn insomnia  Start zoloft 25mg daily x7 days, then increase to 50mg daily thereafter, discussed potential SE including but not limited to GI upset, headache, suicidal thinking  Will refer to IOP   No need for PEC as pt is not an imminent danger to self or others or gravely disabled due to acute psychiatric illness  Discussed that pt should either call clinic for psychiatric crisis symptoms or present to nearest emergency room    Discussed with patient informed consent including diagnosis, risks and benefits of proposed treatment above vs. alternative treatments vs. no  treatment, as well as serious and common side effects of these treatments, and the inherent unpredictability of individual responses to these treatments. The patient expresses understanding of the above and displays the capacity to agree with this current plan. Patient also agrees that, currently, the benefits outweigh the risks and would like to pursue treatment at this time, and had no other questions.    Instructions:  Take all medications as prescribed.    Abstain from recreational drugs and alcohol.  Present to ED or call 911 for SI/HI plan or intent, psychosis, or medical emergency.    Return to Clinic: Follow up in about 2 months (around 9/3/2024).    Total time:   The total time for services performed on the date of the encounter (including review of prior visit notes, review of notes from other providers, review of results from laboratory/imaging studies, face-to-face time with patient, and time spent on other activities directly related to patient care): 32 minutes.    Salvador Dennis MD  [facility]

## 2024-07-05 RX ORDER — DIVALPROEX SODIUM 500 MG/1
500 TABLET, FILM COATED, EXTENDED RELEASE ORAL DAILY
Qty: 30 TABLET | Refills: 4 | Status: SHIPPED | OUTPATIENT
Start: 2024-07-05 | End: 2025-07-05

## 2024-07-09 DIAGNOSIS — R56.9 SEIZURES: Primary | ICD-10-CM

## 2024-08-06 ENCOUNTER — PROCEDURE VISIT (OUTPATIENT)
Dept: SLEEP MEDICINE | Facility: HOSPITAL | Age: 54
End: 2024-08-06
Attending: NURSE PRACTITIONER
Payer: MEDICAID

## 2024-08-06 DIAGNOSIS — R56.9 SEIZURES: ICD-10-CM

## 2024-08-06 PROCEDURE — 95810 POLYSOM 6/> YRS 4/> PARAM: CPT

## 2024-08-12 DIAGNOSIS — R06.02 SHORTNESS OF BREATH: ICD-10-CM

## 2024-08-12 RX ORDER — ALBUTEROL SULFATE 90 UG/1
INHALANT RESPIRATORY (INHALATION)
Qty: 8.5 G | Refills: 1 | Status: SHIPPED | OUTPATIENT
Start: 2024-08-12

## 2024-08-12 NOTE — TELEPHONE ENCOUNTER
Pharmacy requesting refill for pt's albuterol (PROVENTIL/VENTOLIN HFA) 90 mcg/actuation inhaler     LOV: 6/17/24    NOV: 10/17/24

## 2024-08-29 ENCOUNTER — DOCUMENTATION ONLY (OUTPATIENT)
Dept: ADMINISTRATIVE | Facility: HOSPITAL | Age: 54
End: 2024-08-29
Payer: MEDICAID

## 2024-08-29 NOTE — PROGRESS NOTES
Lung Cancer screening  left messages. Unable to contact after 3 attempts sent letter to provider and to patients home address on file .  8/13/2024

## 2024-09-24 DIAGNOSIS — R06.02 SHORTNESS OF BREATH: ICD-10-CM

## 2024-09-24 RX ORDER — ALBUTEROL SULFATE 90 UG/1
INHALANT RESPIRATORY (INHALATION)
Qty: 8.5 G | Refills: 1 | Status: SHIPPED | OUTPATIENT
Start: 2024-09-24

## 2024-10-18 ENCOUNTER — TELEPHONE (OUTPATIENT)
Dept: BEHAVIORAL HEALTH | Facility: CLINIC | Age: 54
End: 2024-10-18
Payer: MEDICAID

## 2024-10-18 NOTE — TELEPHONE ENCOUNTER
----- Message from Etta sent at 10/18/2024 12:40 PM CDT -----  .Who Called: Marvin Leslie    Caller is requesting a sooner appointment. Caller declined first available appointment listed below. Caller will not accept being placed on the waitlist and is requesting a message be sent to doctor.    When is the first available appointment?11/25/24  Options offered (Virtual Visit, Urgent Care): wants to move his appt..   Symptoms:      Preferred Method of Contact: Phone Call  Patient's Preferred Phone Number on File: [phone]  Best Call Back Number, if different:  Additional Information: pt called because he has an appt on 10/23 with another provider and wants to be seen that date as well. States that he does now live in [place] and it would be conventent for him

## 2024-10-21 NOTE — PROGRESS NOTES
[facility] Neurology Initial Office Visit Note    Initial Visit Date: 10/23/2024  Current Visit Date:  10/23/2024    Chief Complaint:     Chief Complaint   Patient presents with    New Patient, Seizure Disorder and Migraines     States tremors on right side are getting worse. Weak left side due to stroke       History of Present Illness:      This is 54 y.o. 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 States that he had been very restless majority of his life but it had been significantly worse with cessation of gabapentin. Socially, patient had been living with a brother in [place] up until last November, when he had moved in with mother who lives in [place], [place]. Mom states that the patient's stepfather is the one taking care of all of the patient's affairs as of now and she is only responsible for taking him to appointments.  They have a hard time keeping appointments in [place] due to travelling distance and would prefer switching care to [place] if able.     Age of Onset : since 2016    Semiology: unclear semiology, states that he becomes very tremulous and dizzy prior to LOC.     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       Medications:     Current Anti-Seizure Medication(s):  Depakote 500 mg daily: not clear when he last took it.    Prior Anti-Seizure Medication(s):  Denied    Surgical Intervention & Devices:     - VNS:  - DBS  - RNS:  - Lobectomy:    Labs:     Results for orders placed or performed in visit on 05/14/24   IN OFFICE EKG 12-LEAD (to [place])    Collection Time: 05/14/24  1:47 PM   Result  Value Ref Range    QRS Duration 88 ms    [facility]HS QTC Calculation 434 ms   Basic metabolic panel    Collection Time: 05/14/24  2:01 PM   Result Value Ref Range    Sodium 140 136 - 145 mmol/L    Potassium 4.2 3.5 - 5.1 mmol/L    Chloride 108 (H) 98 - 107 mmol/L    CO2 26 22 - 29 mmol/L    Glucose 82 74 - 100 mg/dL    Blood Urea Nitrogen 10.7 8.4 - 25.7 mg/dL    Creatinine 0.86 0.73 - 1.18 mg/dL    BUN/Creatinine Ratio 12     Calcium 9.3 8.4 - 10.2 mg/dL    Anion Gap 6.0 mEq/L    eGFR >60 mL/min/1.73/m2   Protime-INR    Collection Time: 05/14/24  2:01 PM   Result Value Ref Range    PT 13.2 11.4 - 14.0 seconds    INR 1.0 <=1.3   APTT    Collection Time: 05/14/24  2:01 PM   Result Value Ref Range    PTT 28.9 23.2 - 33.7 seconds   CBC with Differential    Collection Time: 05/14/24  2:01 PM   Result Value Ref Range    WBC 7.88 4.50 - 11.50 x10(3)/mcL    RBC 4.66 (L) 4.70 - 6.10 x10(6)/mcL    Hgb 14.8 14.0 - 18.0 g/dL    Hct 44.6 42.0 - 52.0 %    MCV 95.7 (H) 80.0 - 94.0 fL    MCH 31.8 (H) 27.0 - 31.0 pg    MCHC 33.2 33.0 - 36.0 g/dL    RDW 12.0 11.5 - 17.0 %    Platelet 262 130 - 400 x10(3)/mcL    MPV 9.6 7.4 - 10.4 fL    Neut % 76.1 %    Lymph % 14.7 %    Mono % 5.8 %    Eos % 2.2 %    Basophil % 0.9 %    Lymph # 1.16 0.6 - 4.6 x10(3)/mcL    Neut # 6.00 2.1 - 9.2 x10(3)/mcL    Mono # 0.46 0.1 - 1.3 x10(3)/mcL    Eos # 0.17 0 - 0.9 x10(3)/mcL    Baso # 0.07 <=0.2 x10(3)/mcL    IG# 0.02 0 - 0.04 x10(3)/mcL    IG% 0.3 %    NRBC% 0.0 %       Studies:      - routine EEG 7/2/2024: Frequent right frontal spike and wave and polyspike and wave discharges with max at F4 and right temporal BIRDS.    - routine EEG 3/26/2024: This is an abnormal routine awake and drowsy EEG with right frontal and right temporal epileptiform discharge and focal dysfunction with superimposing breach artifact.   - NCT 6/26/2024:  I have reviewed the study independently and with the patient. Right MCA distribution encephalomalacia with right distal MCA  aneurysm clip noted. Crani defect noted.     Review of Systems:     Review of Systems   All other systems reviewed and are negative.      Physical Exams:     Vitals:    10/23/24 0846   BP: 132/88   Pulse: 75   Resp: 12   Temp: 97.7 °F (36.5 °C)       Physical Exam  Vitals and nursing note reviewed.   Constitutional:       Appearance: Normal appearance.   HENT:      Head: Normocephalic and atraumatic.      Nose: Nose normal.      Mouth/Throat:      Mouth: Mucous membranes are moist.      Pharynx: Oropharynx is clear.   Eyes:      Conjunctiva/sclera: Conjunctivae normal.   Cardiovascular:      Rate and Rhythm: Normal rate and regular rhythm.      Pulses: Normal pulses.      Heart sounds: Normal heart sounds.   Pulmonary:      Effort: Pulmonary effort is normal.      Breath sounds: Normal breath sounds.   Abdominal:      General: Abdomen is flat.      Palpations: Abdomen is soft.   Musculoskeletal:         General: Normal range of motion.      Cervical back: Normal range of motion.   Neurological:      Mental Status: He is alert.   Psychiatric:         Mood and Affect: Mood normal.         Comprehensive Neurological Exam:  Mental Status: Alert Oriented to Self, Date, and Place.  Comprehension wnl. No dysarthria.   CN II - XII: JUDI, No APD,VFFC, No ptosis OU, EOMI without nystagmus, LT/Temp symmetric in CN V1-3 distribution, Hearing grossly intact, Left Nasolabial fold flattening, Tongue and Uvula midline, Trapezius symmetric bilateral.   Motor: increase tone in LUE/LLE with spastic flexion contracture in LUE, bulk wnl throughout, restlessness that is easily distractible, 3/5 in LUE/LLE, left pronator drift.   Sensory: LT, Proprioception, Vibration, PP, Temp symmetric  Cerebellar: FNF wnl b/l, RAHM wnl b/l  Gait: + spastic left hip circumduction.     Assessment:     This is 54 y.o. 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 Unclear compliance with medications.      Problem List Items Addressed This Visit          Neuro    Localization-related symptomatic epilepsy and epileptic syndromes with complex partial seizures, not intractable, without status epilepticus - Primary (Chronic)    Relevant Medications    ARIPiprazole (ABILIFY) 15 MG Tab    carBAMazepine (CARBATROL) 200 MG CM12    Other Relevant Orders    CBC Auto Differential    Comprehensive Metabolic Panel    Tardive akathisia    Relevant Orders    CBC Auto Differential    Comprehensive Metabolic Panel    Spastic hemiparesis of left nondominant side due to acute nontraumatic subarachnoid hemorrhage       Cardiac/Vascular    History of cerebral aneurysm    Relevant Orders    CBC Auto Differential    Comprehensive Metabolic Panel       Plan:     [] start Carbamazepine 200 mg twice a day  [] CBC, CMP in 2 months     RTC 3 months via Telemedicine      Visit today is associated with current or anticipated ongoing medical care related to this patient's single serious condition/complex condition as documented above.     Seizure education provided: including family planning and teratogenicity of AEDs, risk of uncontrolled seizure disorder, SUDEP. No driving until seizure free for six months ([place] state law). No swimming, climbing heights, or operate heavy machinery without supervision. Patient is advised to avoid Benadryl, Tramadol, Wellbutrin, flashing lights, alcohol, sleep deprivation, and certain anti-biotics that can lower seizure threshold.     I have explained the treatment plan, diagnosis, and prognosis to patient. All questions are answered to the best of my knowledge.     Face to face time 60 minutes, including documentation, chart review, counseling, education, review of test results, relevant medical records, and coordination of care.   I have explained the treatment plan, diagnosis, and  prognosis to patient. All questions are answered to the best of my knowledge.         Niurka Calles MD   General Neurology  10/23/2024

## 2024-10-23 ENCOUNTER — OFFICE VISIT (OUTPATIENT)
Dept: NEUROLOGY | Facility: CLINIC | Age: 54
End: 2024-10-23
Payer: MEDICAID

## 2024-10-23 VITALS
WEIGHT: 164.81 LBS | TEMPERATURE: 98 F | RESPIRATION RATE: 12 BRPM | HEART RATE: 75 BPM | DIASTOLIC BLOOD PRESSURE: 88 MMHG | BODY MASS INDEX: 21.84 KG/M2 | SYSTOLIC BLOOD PRESSURE: 132 MMHG | HEIGHT: 73 IN

## 2024-10-23 DIAGNOSIS — G40.209 LOCALIZATION-RELATED SYMPTOMATIC EPILEPSY AND EPILEPTIC SYNDROMES WITH COMPLEX PARTIAL SEIZURES, NOT INTRACTABLE, WITHOUT STATUS EPILEPTICUS: Primary | Chronic | ICD-10-CM

## 2024-10-23 DIAGNOSIS — G81.14: ICD-10-CM

## 2024-10-23 DIAGNOSIS — I60.9: ICD-10-CM

## 2024-10-23 DIAGNOSIS — Z86.79 HISTORY OF CEREBRAL ANEURYSM: ICD-10-CM

## 2024-10-23 DIAGNOSIS — G25.71 TARDIVE AKATHISIA: ICD-10-CM

## 2024-10-23 PROCEDURE — 99215 OFFICE O/P EST HI 40 MIN: CPT | Mod: PBBFAC | Performed by: PSYCHIATRY & NEUROLOGY

## 2024-10-23 RX ORDER — ARIPIPRAZOLE 15 MG/1
15 TABLET ORAL
COMMUNITY
Start: 2024-10-07

## 2024-10-23 RX ORDER — CARBAMAZEPINE 200 MG/1
200 CAPSULE, EXTENDED RELEASE ORAL 2 TIMES DAILY
Qty: 180 CAPSULE | Refills: 1 | Status: SHIPPED | OUTPATIENT
Start: 2024-10-23 | End: 2025-04-21

## 2024-11-05 ENCOUNTER — TELEPHONE (OUTPATIENT)
Dept: INTERNAL MEDICINE | Facility: CLINIC | Age: 54
End: 2024-11-05
Payer: MEDICAID

## 2024-11-05 NOTE — TELEPHONE ENCOUNTER
As long as he does not have any fever or acute illness then it is okay to receive shingles vaccine.

## 2024-11-05 NOTE — TELEPHONE ENCOUNTER
Spoke with patient informed that he went to [facility] pharmacy for Shingles vaccine was told to check with PCP if ok to receive.Please advise

## 2024-11-05 NOTE — TELEPHONE ENCOUNTER
----- Message from Yaneth sent at 11/4/2024  9:54 AM CST -----  Who Called: Marvin Leslie    Caller is requesting assistance/information from provider's office.    Symptoms (please be specific): n/a   How long has patient had these symptoms:   n/a   List of preferred pharmacies on file (remove unneeded): [unfilled]  If different, enter pharmacy into here including location and phone number:  n/a       Preferred Method of Contact: Phone Call  Patient's Preferred Phone Number on File: [phone]   Best Call Back Number, if different:  Additional Information: medical advice pt called to discuss if it is safe to take shingles vaccine, please advise, thanks

## 2024-11-19 ENCOUNTER — TELEPHONE (OUTPATIENT)
Dept: INTERNAL MEDICINE | Facility: CLINIC | Age: 54
End: 2024-11-19
Payer: MEDICAID

## 2024-11-19 NOTE — TELEPHONE ENCOUNTER
----- Message from Bin Romero sent at 11/18/2024  2:32 PM CST -----  Regarding: return call  Who Called: Marvin Leslie    Patient is returning phone call    Who Left Message for Patient: na    Does the patient know what this is regarding?:na      Preferred Method of Contact: Phone Call  Patient's Preferred Phone Number on File: [phone]   Best Call Back Number, if different:    Additional Information: pt stated he received a missed call, not sure from who. No encounter in chart. Please advise

## 2024-11-19 NOTE — TELEPHONE ENCOUNTER
Deepa with mother of patient ,she informed me that Mr Prakash  Dis receive a follow up call from previous contact

## 2024-11-20 ENCOUNTER — TELEPHONE (OUTPATIENT)
Dept: BEHAVIORAL HEALTH | Facility: CLINIC | Age: 54
End: 2024-11-20
Payer: MEDICAID

## 2024-11-20 ENCOUNTER — OFFICE VISIT (OUTPATIENT)
Dept: BEHAVIORAL HEALTH | Facility: CLINIC | Age: 54
End: 2024-11-20
Payer: MEDICAID

## 2024-11-20 ENCOUNTER — TELEPHONE (OUTPATIENT)
Dept: FAMILY MEDICINE | Facility: CLINIC | Age: 54
End: 2024-11-20
Payer: MEDICAID

## 2024-11-20 DIAGNOSIS — F41.9 ANXIETY: ICD-10-CM

## 2024-11-20 DIAGNOSIS — G47.00 INSOMNIA, UNSPECIFIED TYPE: ICD-10-CM

## 2024-11-20 DIAGNOSIS — F33.2 SEVERE EPISODE OF RECURRENT MAJOR DEPRESSIVE DISORDER, WITHOUT PSYCHOTIC FEATURES: Primary | ICD-10-CM

## 2024-11-20 PROCEDURE — 99214 OFFICE O/P EST MOD 30 MIN: CPT | Mod: AF,HB,95, | Performed by: STUDENT IN AN ORGANIZED HEALTH CARE EDUCATION/TRAINING PROGRAM

## 2024-11-20 PROCEDURE — 1159F MED LIST DOCD IN RCRD: CPT | Mod: CPTII,95,, | Performed by: STUDENT IN AN ORGANIZED HEALTH CARE EDUCATION/TRAINING PROGRAM

## 2024-11-20 PROCEDURE — 3044F HG A1C LEVEL LT 7.0%: CPT | Mod: CPTII,95,, | Performed by: STUDENT IN AN ORGANIZED HEALTH CARE EDUCATION/TRAINING PROGRAM

## 2024-11-20 PROCEDURE — 1160F RVW MEDS BY RX/DR IN RCRD: CPT | Mod: CPTII,95,, | Performed by: STUDENT IN AN ORGANIZED HEALTH CARE EDUCATION/TRAINING PROGRAM

## 2024-11-20 RX ORDER — SERTRALINE HYDROCHLORIDE 100 MG/1
100 TABLET, FILM COATED ORAL DAILY
Qty: 30 TABLET | Refills: 5 | Status: SHIPPED | OUTPATIENT
Start: 2024-11-20

## 2024-11-20 RX ORDER — ARIPIPRAZOLE 10 MG/1
10 TABLET ORAL DAILY
Qty: 30 TABLET | Refills: 5 | Status: SHIPPED | OUTPATIENT
Start: 2024-11-20

## 2024-11-20 NOTE — TELEPHONE ENCOUNTER
----- Message from whodoyou sent at 11/20/2024  7:35 AM CST -----  Regarding: med advice  Who Called: Marvin Leslie    Caller is requesting assistance/information from provider's office.    Symptoms (please be specific):    How long has patient had these symptoms:    List of preferred pharmacies on file (remove unneeded): [unfilled]  If different, enter pharmacy into here including location and phone number:       Preferred Method of Contact: Phone Call  Patient's Preferred Phone Number on File: [phone]  Best Call Back Number, if different:  Additional Information: pt is requesting a call back with questions regarding virtual appt today @ 2:30

## 2024-11-20 NOTE — TELEPHONE ENCOUNTER
----- Message from Bin Romero sent at 11/20/2024 10:52 AM CST -----  Regarding: return call  Who Called: Marvin Leslie    Patient is returning phone call    Who Left Message for Patient: Dora   Does the patient know what this is regarding?: virtual visit       Preferred Method of Contact: Phone Call  Patient's Preferred Phone Number on File: [phone]   Best Call Back Number, if different:    Additional Information: pt returning call

## 2024-11-20 NOTE — PROGRESS NOTES
"Outpatient Psychiatry Follow-Up Visit    11/20/2024    Clinical Status of Patient:  Outpatient (Ambulatory)    Chief Complaint:  Marvin Leslie is a 54 y.o. male who presents today for follow-up of mood disorder. Patient last seen for follow-up on 7/3/2024. Met with patient, pt's mother and pt's father.      TELE PSYCHIATRY Disclaimer   *The patient was informed despite using HIPPA compliant technology there may be risks including security breach, technological failure, inability to perform a comprehensive physical exam which could delay or prevent an accurate diagnosis, and potential complications from treatment decisions rendered over a telemedical platform.   The patient was also informed of the relationship between the physician and patient and the respective role of any other health care provider with respect to management of the patient; and notified that the pt may decline to receive medical services by telemedicine and may withdraw from such care at any time.     Patient's Current location: [address] [place] [address]     In Case of Emergency pts next of kin  Name:Jada Chowdary  Phone number:  [phone]   Visit type: Virtual visit with synchronous audio and video  Total time spent with patient: 30 minutes    Interval History and Content of Current Session:  Interim Events/Subjective Report/Content of Current Session:   Pt reports doing "so so"  overall.  Says that he had to move out of his brother's house and is now living with his parents.  Notes that he has been getting along well with his parents and does not have any problems with his current living arrangement.  Main concern for today is anxiety and "shaking."  Reports "ok" mood, mood labile and endorses mild depression, extremely high anxiety.  Sleep poor, awakens hourly, sleeps 5-6 hrs nightly, feels rested on awakening. Appetite high, weight increasing (happy about this).  Energy good, motivation good.  Denies irritability, some " "hopelessness.  Denies SI/HI/AVH/paranoia, denies plan or desire for self harm or harm to others.  Denies SE from current regimen. Denies change in chronic somatic complaints. Pt voices desire to adjust regimen to address ongoing symptoms.      Psychiatric Review of Systems-is patient experiencing or having changes in  Integrated into HPI above.     Review of Systems   PSYCHIATRIC: Pertinant items are noted in the narrative.  CONSTITUTIONAL: No weight gain or loss.  MUSCULOSKELETAL: No pain or stiffness of the joints.  NEUROLOGIC: + headaches, + loss of sensation, +weakness LUE, LLE, +tremor  CARDIAC: No CP, no palpitations  RESPIRATORY: No shortness of breath.  CARDIOVASCULAR: No tachycardia or chest pain.  GASTROINTESTINAL: No nausea, vomiting, pain, constipation or diarrhea.    Past Medical, Family and Social History: The patient's past medical, family and social history have been reviewed and updated as appropriate within the electronic medical record - see encounter notes.    Compliance: good    Side effects: see above    Risk Parameters:  Patient reports suicidal ideation: vague SI, no intention or plan  Patient reports no homicidal ideation  Patient reports no self-injurious behavior  Patient reports no violent behavior    Exam (detailed: at least 9 elements; comprehensive: all 15 elements)   Constitutional  Vitals:  Most recent vital signs, dated less than 90 days prior to this appointment, were reviewed.     There were no vitals filed for this visit.       General:   Constitutional: No acute distress, appears stated age, casually dressed    Neurologic:   Motor: no abnormal involuntary movements observed  Gait: +limp    Mental status examination:   Appearance: appears stated age, casually dressed, no acute distress  Behavior: unremarkable for situation, calm and cooperative  Mood: "ok"  Affect: mood congruent, constricted, and anxious-appearing  Thought process: linear and goal directed  Thought content: no " plan or desire for self harm or harm to others, denies paranoia, no delusional ideation volunteered  Perceptions: denies hallucinations or other altered perceptions  Associations: appropriate for conversation  Orientation: oriented to day of week, month, year, location, and situation  Language: English, fluid  Attention: able to attend to interview  Insight: fair  Judgement: good    PHQ9:  Over the last two weeks how often have you been bothered by little interest or pleasure in doing things: 1  Over the last two weeks how often have you been bothered by feeling down, depressed or hopeless: 1  PHQ-2 Total Score: 2  PHQ-9 Score: 21  PHQ-9 Interpretation: Severe        7/3/2024     7:30 AM 5/28/2024     1:00 PM   GAD7   1. Feeling nervous, anxious, or on edge? 3 3   2. Not being able to stop or control worrying? 2 3   3. Worrying too much about different things? 3 3   4. Trouble relaxing? 3 3   5. Being so restless that it is hard to sit still? 3 3   6. Becoming easily annoyed or irritable? 3 3   7. Feeling afraid as if something awful might happen? 3 3   8. If you checked off any problems, how difficult have these problems made it for you to do your work, take care of things at home, or get along with other people? 3 3   LISA-7 Score 20 21     Assessment and Diagnosis   Status/Progress: Based on the examination today, the patient's problem(s) is/are adequately but not ideally controlled.  New problems have not been presented today.   Diagnostic uncertainty is complicating management of the primary condition.  Number of separate conditions addressed during today's visit: 3 (mood fair control, anxiety uncontrolled, insomnia uncontrolled) .  Medication management: Yes (modifying previously ordered medication, continuing previously ordered medication).  Are referral(s) being ordered today: No.  Complexity (level) of medical decision making employed in the encounter: HIGH.      General Impression:    ICD-10-CM ICD-9-CM  "  1. Severe episode of recurrent major depressive disorder, without psychotic features  F33.2 296.33   2. Anxiety  F41.9 300.00   3. Insomnia, unspecified type  G47.00 780.52     Intervention/Counseling/Treatment Plan   Continue trazodone 100-200mg nightly prn insomnia  Increase zoloft to 100mg daily, will consider further uptitration in 4 wks if pt has inadequate treatment response  Decrease abilify to 10mg daily ("shaking" may be akithisia vs drug induced parkinsonism  Tegretol likely having an impact on metabolism of above psychotropic medications, will monitor and adjust accordingly  No need for PEC as pt is not an imminent danger to self or others or gravely disabled due to acute psychiatric illness  Discussed that pt should either call clinic for psychiatric crisis symptoms or present to nearest emergency room    Discussed with patient informed consent including diagnosis, risks and benefits of proposed treatment above vs. alternative treatments vs. no treatment, as well as serious and common side effects of these treatments, and the inherent unpredictability of individual responses to these treatments. The patient expresses understanding of the above and displays the capacity to agree with this current plan. Patient also agrees that, currently, the benefits outweigh the risks and would like to pursue treatment at this time, and had no other questions.    Instructions:  Take all medications as prescribed.    Abstain from recreational drugs and alcohol.  Present to ED or call 911 for SI/HI plan or intent, psychosis, or medical emergency.    Return to Clinic: Follow up in about 2 months (around 1/20/2025).    Total time:   The total time for services performed on the date of the encounter (including review of prior visit notes, review of notes from other providers, review of results from laboratory/imaging studies, face-to-face time with patient, and time spent on other activities directly related to patient " care): 30 minutes.    Salvador Dennis MD  [facility]

## 2024-12-03 DIAGNOSIS — F33.2 SEVERE EPISODE OF RECURRENT MAJOR DEPRESSIVE DISORDER, WITHOUT PSYCHOTIC FEATURES: ICD-10-CM

## 2024-12-03 RX ORDER — ARIPIPRAZOLE 10 MG/1
10 TABLET ORAL DAILY
Qty: 30 TABLET | Refills: 5 | Status: SHIPPED | OUTPATIENT
Start: 2024-12-03

## 2024-12-26 DIAGNOSIS — G47.00 INSOMNIA, UNSPECIFIED TYPE: ICD-10-CM

## 2024-12-26 NOTE — TELEPHONE ENCOUNTER
Please see attached refill request.    Next scheduled office visit: 2/6/2025.    Last office visit: 11/20/2024.     Thank you!

## 2024-12-30 RX ORDER — TRAZODONE HYDROCHLORIDE 100 MG/1
TABLET ORAL
Qty: 60 TABLET | Refills: 5 | Status: SHIPPED | OUTPATIENT
Start: 2024-12-30

## 2025-01-06 ENCOUNTER — TELEPHONE (OUTPATIENT)
Dept: PRIMARY CARE CLINIC | Facility: CLINIC | Age: 55
End: 2025-01-06
Payer: MEDICAID

## 2025-01-06 NOTE — TELEPHONE ENCOUNTER
----- Message from [place] sent at 1/6/2025 11:28 AM CST -----  Contact: CARLOS CHEUNG [[id number]]  ...Type:  Patient Requesting Appointment   New Patient   Who Called:CARLOS CHEUNG [[id number]]   Symptoms?: establish care  Would the patient rather a call back or a response via [facility]?  call  Best Call Back Number:  [phone] (work)  Additional Information:

## 2025-01-08 ENCOUNTER — TELEPHONE (OUTPATIENT)
Dept: FAMILY MEDICINE | Facility: CLINIC | Age: 55
End: 2025-01-08
Payer: MEDICAID

## 2025-02-06 ENCOUNTER — TELEPHONE (OUTPATIENT)
Dept: BEHAVIORAL HEALTH | Facility: CLINIC | Age: 55
End: 2025-02-06
Payer: MEDICAID

## 2025-02-06 ENCOUNTER — OFFICE VISIT (OUTPATIENT)
Dept: BEHAVIORAL HEALTH | Facility: CLINIC | Age: 55
End: 2025-02-06
Payer: MEDICAID

## 2025-02-06 DIAGNOSIS — F33.2 SEVERE EPISODE OF RECURRENT MAJOR DEPRESSIVE DISORDER, WITHOUT PSYCHOTIC FEATURES: Primary | ICD-10-CM

## 2025-02-06 DIAGNOSIS — F41.9 ANXIETY: ICD-10-CM

## 2025-02-06 DIAGNOSIS — G47.00 INSOMNIA, UNSPECIFIED TYPE: ICD-10-CM

## 2025-02-06 PROCEDURE — 1160F RVW MEDS BY RX/DR IN RCRD: CPT | Mod: CPTII,93,, | Performed by: STUDENT IN AN ORGANIZED HEALTH CARE EDUCATION/TRAINING PROGRAM

## 2025-02-06 PROCEDURE — 98012 SYNCH AUDIO-ONLY EST SF 10: CPT | Mod: 93,,, | Performed by: STUDENT IN AN ORGANIZED HEALTH CARE EDUCATION/TRAINING PROGRAM

## 2025-02-06 PROCEDURE — 1159F MED LIST DOCD IN RCRD: CPT | Mod: CPTII,93,, | Performed by: STUDENT IN AN ORGANIZED HEALTH CARE EDUCATION/TRAINING PROGRAM

## 2025-02-06 RX ORDER — SERTRALINE HYDROCHLORIDE 100 MG/1
100 TABLET, FILM COATED ORAL DAILY
Qty: 30 TABLET | Refills: 5 | Status: SHIPPED | OUTPATIENT
Start: 2025-02-06

## 2025-02-06 RX ORDER — QUETIAPINE FUMARATE 100 MG/1
TABLET, FILM COATED ORAL
Qty: 60 TABLET | Refills: 5 | Status: SHIPPED | OUTPATIENT
Start: 2025-02-06

## 2025-02-06 RX ORDER — TRAZODONE HYDROCHLORIDE 100 MG/1
TABLET ORAL
Qty: 60 TABLET | Refills: 5 | Status: SHIPPED | OUTPATIENT
Start: 2025-02-06

## 2025-02-06 NOTE — TELEPHONE ENCOUNTER
----- Message from [employer] sent at 2/6/2025  7:53 AM CST -----  Regarding: med advice  Who Called: Marvin Leslie    Caller is requesting assistance/information from provider's office.    Symptoms (please be specific):    How long has patient had these symptoms:    List of preferred pharmacies on file (remove unneeded): [unfilled]  If different, enter pharmacy into here including location and phone number:       Preferred Method of Contact: Phone Call  Patient's Preferred Phone Number on File: [phone]   Best Call Back Number, if different:  Additional Information: pt is requesting a call back with questions regarding appt today

## 2025-02-13 ENCOUNTER — OFFICE VISIT (OUTPATIENT)
Dept: CARDIOLOGY | Facility: CLINIC | Age: 55
End: 2025-02-13
Payer: MEDICAID

## 2025-02-13 VITALS
WEIGHT: 164 LBS | BODY MASS INDEX: 21.05 KG/M2 | TEMPERATURE: 98 F | HEART RATE: 72 BPM | OXYGEN SATURATION: 97 % | RESPIRATION RATE: 18 BRPM | SYSTOLIC BLOOD PRESSURE: 121 MMHG | HEIGHT: 74 IN | DIASTOLIC BLOOD PRESSURE: 77 MMHG

## 2025-02-13 DIAGNOSIS — I10 PRIMARY HYPERTENSION: Chronic | ICD-10-CM

## 2025-02-13 DIAGNOSIS — Z86.79 HISTORY OF CEREBRAL ANEURYSM: ICD-10-CM

## 2025-02-13 DIAGNOSIS — I20.89 STABLE ANGINA PECTORIS: Primary | ICD-10-CM

## 2025-02-13 DIAGNOSIS — E78.49 OTHER HYPERLIPIDEMIA: ICD-10-CM

## 2025-02-13 PROCEDURE — 99215 OFFICE O/P EST HI 40 MIN: CPT | Mod: PBBFAC

## 2025-02-13 PROCEDURE — 3074F SYST BP LT 130 MM HG: CPT | Mod: CPTII,,,

## 2025-02-13 PROCEDURE — 1159F MED LIST DOCD IN RCRD: CPT | Mod: CPTII,,,

## 2025-02-13 PROCEDURE — 99214 OFFICE O/P EST MOD 30 MIN: CPT | Mod: S$PBB,,,

## 2025-02-13 PROCEDURE — 1160F RVW MEDS BY RX/DR IN RCRD: CPT | Mod: CPTII,,,

## 2025-02-13 PROCEDURE — 3078F DIAST BP <80 MM HG: CPT | Mod: CPTII,,,

## 2025-02-13 PROCEDURE — 3008F BODY MASS INDEX DOCD: CPT | Mod: CPTII,,,

## 2025-02-13 NOTE — PROGRESS NOTES
"CHIEF COMPLAINT:   Chief Complaint   Patient presents with    Follow-up     6 mos f/u denies cardiac targets                                                  HPI:  Marvin Leslie 54 y.o. male  PMHx multiple hemorrhagic CVAs and arterial aneurysms s/p clipping and M2 aneurysm bypass using a SVG graft.  The patient previously reported a coronary artery bypass surgery but since last visit records have been obtained from [place], [place] and the bypass of the brain aneurysm is the only major surgery he has had. He does not know if he was ever evaluated for vasculitis.  No history of CAD per outside records.  Over the last year, patient has undergone a significant cardiac workup including echocardiogram, nuclear stress test, and subsequent [facility].  Coronary angiogram revealed normal coronary arteries.  See full report below.    Today the patient presents for follow up and states that he feels well from cardiac.  His main complaint today is migraines.  He states that this is typical for him as he has several neurological issues.  States that every once in a great while I have an episode of chest pain", but generally he has no issues.  He endorses stable SOB/LAWRENCE which is his baseline.  He states that it improves with albuterol.  He has occasional palpitations when feeling stressed or anxious.  He becomes lightheaded and dizzy if he over exerts himself.  He associates this more once his neurological issues.  Otherwise he denies any PND, orthopnea, near-syncope, syncope, lower extremity edema, or claudication symptoms.  Patient is somewhat physically active in his day-to-day life.  He uses cane for ambulation assistance.  He has severe joint pain in bilateral knees and states that this is a limiting factor for him.  He reports compliance his current medications and is tolerating them well.  He denies any tobacco or other illicit drug use at this time.                                                                                "                                                                                                                                                                                                                                                                                                                                                                                                       CARDIAC TESTING:  Results for orders placed during the hospital encounter of 07/20/23    Echo    Interpretation Summary  · The left ventricle is normal in size with mild concentric hypertrophy and normal systolic function.  · Normal left ventricular diastolic function.  · The estimated ejection fraction is 60%.  · Normal right ventricular size with normal right ventricular systolic function.  · Normal central venous pressure (3 mmHg).  · The estimated PA systolic pressure is 28 mmHg.    Results for orders placed during the hospital encounter of 04/08/24    Nuclear Stress - Cardiology Interpreted    Interpretation Summary    Abnormal myocardial perfusion scan.    There are two significant perfusion abnormalities.    Perfusion Abnormality #1 - There is a moderate to severe intensity, moderate sized, reversible perfusion abnormality that is consistent with ischemia in the basal to apical lateral apical wall(s) in the typical distribution of the LCX territory.    Perfusion Abnormality #2 - There is a moderate sized, moderate to severe intensity, reversible perfusion abnormality that is consistent with ischemia in the basal to distal inferoseptal wall(s) in the typical distribution of the RCA territory.    There are no other significant perfusion abnormalities.    The gated perfusion images showed an ejection fraction of 62% at rest. The gated perfusion images showed an ejection fraction of 73% post stress.    The patient reported no chest pain during the stress test.    There were no arrhythmias during stress.    The nuclear stress test  is  fair quality.  On the nuclear stress test the EF is normal.  If the patient has an echo, the EF on the echo is considered more accurate.    The patient has a moderate to high risk nuclear stress test due to inferior and lateral reversible defect.    If clinically indicated, consider further evaluation with coronary angiogram.     Results for orders placed during the hospital encounter of 05/22/24    Cardiac catheterization    Conclusion    The pre-procedure left ventricular end diastolic pressure was 12.    The estimated blood loss was <50 mL.    The coronary arteries were normal..    Coronaries:  Patent  LVEDP:  12 mm Hg    PERIPHERAL ANGIOGRAM  Right Femoral:  normal size and patent    RECOMMENDATIONS  Medical management  Risk factor modifications  Activity -- avoid straining with affected limb for one week  Exercise as tolerated  Precautions post D/C -- come to ER for hematoma, unusual pain, erythema, or unusual drainage at access site  Precautions post D/C -- if develop bleeding or hematoma at access site, hold pressure at access site, and come to ER        The procedure log was documented by Documenter: Thais Bran RN and verified by Khang Fletcher MD.    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    Encounter for screening colonoscopy    Stable angina pectoris    Shortness of breath    Anxiety    Insomnia    History of cerebral aneurysm    Tardive akathisia    Spastic hemiparesis of left nondominant side due to acute  nontraumatic subarachnoid hemorrhage     Past Surgical History:   Procedure Laterality Date    ANGIOGRAM, CORONARY, WITH LEFT HEART CATHETERIZATION N/A 05/22/2024    Procedure: Angiogram, Coronary, with Left Heart Cath;  Surgeon: Khang Fletcher MD;  Location[facility] CATH LAB;  Service: Cardiology;  Laterality: N/A;    BRAIN SURGERY      BYPASS OF MESENTERIC ARTERY      FRACTURE SURGERY      REPAIR OF ANEURYSM      (2.)2016 and 2018     Social History     Socioeconomic History    Marital status:     Number of children: 0   Occupational History    Occupation: Unemployed   Tobacco Use    Smoking status: Former     Current packs/day: 0.00     Average packs/day: 1 pack/day for 34.0 years (34.0 ttl pk-yrs)     Types: Cigarettes     Start date: 11/3/1987     Quit date: 11/3/2021     Years since quitting: 3.2    Smokeless tobacco: Never   Substance and Sexual Activity    Alcohol use: Not Currently    Drug use: Not Currently     Comment: past     Social Drivers of Health     Financial Resource Strain: High Risk (2/6/2025)    Overall Financial Resource Strain (CARDIA)     Difficulty of Paying Living Expenses: Very hard   Food Insecurity: Food Insecurity Present (2/6/2025)    Hunger Vital Sign     Worried About Running Out of Food in the Last Year: Sometimes true     Ran Out of Food in the Last Year: Sometimes true   Transportation Needs: Patient Declined (3/14/2024)    PRAPARE - Transportation     Lack of Transportation (Medical): Patient declined     Lack of Transportation (Non-Medical): Patient declined   Physical Activity: Sufficiently Active (2/6/2025)    Exercise Vital Sign     Days of Exercise per Week: 3 days     Minutes of Exercise per Session: 60 min   Stress: Stress Concern Present (2/6/2025)    [employer] - Occupational Stress Questionnaire     Feeling of Stress : Very much   Housing Stability: Patient Declined (3/14/2024)    Housing Stability Vital Sign     Unable to Pay for  Housing in the Last Year: Patient declined     Unstable Housing in the Last Year: Patient declined        Family History   Problem Relation Name Age of Onset    Cancer Father Leroy bhat      Review of patient's allergies indicates:  No Known Allergies      ROS:  Review of Systems   Constitutional: Negative.  Negative for malaise/fatigue.   HENT: Negative.     Eyes: Negative.    Respiratory: Negative.     Cardiovascular: Negative.  Negative for chest pain, palpitations, orthopnea, claudication, leg swelling and PND.   Gastrointestinal: Negative.    Genitourinary: Negative.    Musculoskeletal: Negative.    Skin: Negative.    Neurological:  Positive for dizziness, tremors and headaches.   Endo/Heme/Allergies: Negative.    Psychiatric/Behavioral:  Positive for depression.                                                                                                                                                                                 Negative except as stated in the history of present illness. See HPI for details.    PHYSICAL EXAM:  There were no vitals taken for this visit.    Physical Exam  Vitals reviewed.   Constitutional:       Appearance: Normal appearance. He is not ill-appearing.   HENT:      Head: Normocephalic.   Eyes:      Extraocular Movements: Extraocular movements intact.   Neck:      Vascular: No carotid bruit.   Cardiovascular:      Rate and Rhythm: Normal rate and regular rhythm.      Pulses: Normal pulses.      Heart sounds: Normal heart sounds.   Pulmonary:      Effort: Pulmonary effort is normal.      Breath sounds: Normal breath sounds.   Abdominal:      General: There is no distension.   Musculoskeletal:         General: Normal range of motion.      Right lower leg: No edema.      Left lower leg: No edema.   Skin:     General: Skin is warm and dry.   Neurological:      General: No focal deficit present.      Mental Status: He is alert and oriented to person, place, and time.    Psychiatric:         Mood and Affect: Mood normal.         Behavior: Behavior normal.         Current Outpatient Medications   Medication Instructions    albuterol (PROVENTIL/VENTOLIN HFA) 90 mcg/actuation inhaler INHALE TWO PUFFS BY MOUTH EVERY 6 HOURS AS NEEDED FOR WHEEZING OR SHORTNESS OF BREATH.    atorvastatin (LIPITOR) 40 mg, Oral, Daily    carBAMazepine (CARBATROL) 200 mg, Oral, 2 times daily    metoprolol succinate (TOPROL-XL) 25 MG 24 hr tablet Take 1 tablet by mouth once daily.    nitroGLYCERIN (NITROSTAT) 0.4 mg, Sublingual, Every 5 min PRN    QUEtiapine (SEROQUEL) 100 MG Tab Take 100mg (one tablet) by mouth nightly for 1-2 weeks, then increase to 200mg (2 tablets) by mouth nightly thereafter.    sertraline (ZOLOFT) 100 mg, Oral, Daily    tamsulosin (FLOMAX) 0.4 mg Cap 1 capsule    traZODone (DESYREL) 100 MG tablet Take 100-200mg (1-2 tablets) by mouth nightly as needed for insomnia.        All medications, laboratory studies, cardiac diagnostic imaging reviewed.     Lab Results   Component Value Date    LDL 76.00 03/14/2024    TRIG 107 03/14/2024    TRIG 51 08/12/2023    CREATININE 0.86 05/14/2024    K 4.2 05/14/2024        ASSESSMENT/PLAN:    54 year old male with PMHx of prior polysubstance abuse, multiple hemorrhagic CVAs and ruptures brain aneurysms s/p SVG conduit.     - Denies any cardiac complaints today- see HPI   - Stable SOB/LAWRENCE that is his baseline-he states it is relieved with albuterol   - Occasional lightheadedness and dizziness but does near-syncope or syncopal episodes he states that this typically occurs if he over exerts himself  - Occasional palpitations that occur when feeling overwhelmed or stressed  - NABILA abnormal on 4/8/24  - Angiogram conducted on 5/2024 with no abnormalities of coronary arteries   - Obtain arterial LE studies to assess for PAD given complaint of leg pain with mildly diminished pedal pulses BL- ordered at last visit, patient did not complete- his complaints  today seem more consistent with MSK pain given that pain localized to knee joints- encouraged to complete US if able to though   - Continue lipitor 40 mg, Toprol 25 mg  - PRN nitro for chest pain  - LDL 76 - labs per PCP  - No need for further testing at this time  - Will obtain EKG at next visit       Follow up in Cardiology clinic in 6 months or sooner   Follow up PCP as directed   Please notify clinic if any new concerns or any change in symptoms

## 2025-02-13 NOTE — PATIENT INSTRUCTIONS
Follow up in Cardiology clinic in 6 months or sooner   Follow up PCP as directed   Please notify clinic if any new concerns or any change in symptoms

## 2025-02-13 NOTE — PROGRESS NOTES
[facility] Neurology Follow Up Telemedicine Visit Note    Initial Visit Date: 10/23/2024  Last Visit Date: 10/23/2024  Current Visit Date:  02/17/2025    Chief Complaint:     Chief Complaint   Patient presents with    Seizures     Patient states he has no complaints or changes with seizure activity. He states he is having a lot of migraines and nerve pain.        History of Present Illness:      This is 54 y.o. 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 Unclear compliance with medication During last visit,  mg twice a day was started. CBC, CMP was ordered in 2 months. Still having headaches.     Age of Onset : since 2016    Semiology: unclear semiology, states that he becomes very tremulous and dizzy prior to LOC.     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       Medications:     Current Anti-Seizure Medication(s):  Carbamazepine  mg twice a day (10/23/2024 to present)  Depakote 500 mg daily: not clear when he last took it.    Prior Anti-Seizure Medication(s):  Denied    Surgical Intervention & Devices:     - VNS:  - DBS  - RNS:  - Lobectomy:    Labs:     Results for orders placed or performed in visit on 05/14/24   IN OFFICE EKG 12-LEAD (to [place])    Collection Time: 05/14/24  1:47 PM   Result Value Ref Range    QRS Duration 88 ms    [facility] QTC Calculation 434 ms   Basic metabolic panel    Collection Time: 05/14/24  2:01 PM   Result Value Ref Range    Sodium 140 136 - 145 mmol/L    Potassium 4.2 3.5 - 5.1 mmol/L    Chloride 108 (H) 98 - 107 mmol/L    CO2 26 22 - 29  mmol/L    Glucose 82 74 - 100 mg/dL    Blood Urea Nitrogen 10.7 8.4 - 25.7 mg/dL    Creatinine 0.86 0.73 - 1.18 mg/dL    BUN/Creatinine Ratio 12     Calcium 9.3 8.4 - 10.2 mg/dL    Anion Gap 6.0 mEq/L    eGFR >60 mL/min/1.73/m2   Protime-INR    Collection Time: 05/14/24  2:01 PM   Result Value Ref Range    PT 13.2 11.4 - 14.0 seconds    INR 1.0 <=1.3   APTT    Collection Time: 05/14/24  2:01 PM   Result Value Ref Range    PTT 28.9 23.2 - 33.7 seconds   CBC with Differential    Collection Time: 05/14/24  2:01 PM   Result Value Ref Range    WBC 7.88 4.50 - 11.50 x10(3)/mcL    RBC 4.66 (L) 4.70 - 6.10 x10(6)/mcL    Hgb 14.8 14.0 - 18.0 g/dL    Hct 44.6 42.0 - 52.0 %    MCV 95.7 (H) 80.0 - 94.0 fL    MCH 31.8 (H) 27.0 - 31.0 pg    MCHC 33.2 33.0 - 36.0 g/dL    RDW 12.0 11.5 - 17.0 %    Platelet 262 130 - 400 x10(3)/mcL    MPV 9.6 7.4 - 10.4 fL    Neut % 76.1 %    Lymph % 14.7 %    Mono % 5.8 %    Eos % 2.2 %    Basophil % 0.9 %    Lymph # 1.16 0.6 - 4.6 x10(3)/mcL    Neut # 6.00 2.1 - 9.2 x10(3)/mcL    Mono # 0.46 0.1 - 1.3 x10(3)/mcL    Eos # 0.17 0 - 0.9 x10(3)/mcL    Baso # 0.07 <=0.2 x10(3)/mcL    Imm Gran # 0.02 0 - 0.04 x10(3)/mcL    Imm Grans % 0.3 %    NRBC% 0.0 %       Studies:      - routine EEG 7/2/2024: Frequent right frontal spike and wave and polyspike and wave discharges with max at F4 and right temporal BIRDS.    - routine EEG 3/26/2024: This is an abnormal routine awake and drowsy EEG with right frontal and right temporal epileptiform discharge and focal dysfunction with superimposing breach artifact.   - [facility] 6/26/2024:  I have reviewed the study independently and with the patient. Right MCA distribution encephalomalacia with right distal MCA aneurysm clip noted. Crani defect noted.   - CTA Head 6/23/2023:   1. There is no acute intracranial abnormality or definite change compared to prior studies.  There is extensive encephalomalacia in the right middle cerebral artery vascular territory.  Multiple  surgical clips are present on the right at the expected site of the middle cerebral artery trifurcation.  There is no acute intracranial hemorrhage or obvious acute edema or ischemia.  2. The cervical vertebral and carotid arteries are normal.  There is no measurable stenosis of the internal carotid arteries.  3. The right external carotid artery has a superficial course and this graft enters the right middle cranial fossa at the site of craniotomy and help supply the right M2 and M3 branches.  This vessel is patent and the appearance is unchanged.  4. The posterior circulation is widely patent.  There is developmental variation with fetal origin of the right posterior cerebral artery on the right.    Review of Systems:     Review of Systems   All other systems reviewed and are negative.      Physical Exams:     Physical Exam  Nursing note reviewed.   HENT:      Head: Normocephalic.   Pulmonary:      Effort: Pulmonary effort is normal.   Musculoskeletal:         General: Normal range of motion.      Cervical back: Normal range of motion.   Neurological:      Mental Status: He is alert.       Telemedicine Comprehensive Neurological Exam:  Mental Status: Alert Oriented to Self, Date, and Place. Comprehension wnl. No dysarthria.   CN II - XII: JUDI, VA grossly intact, No ptosis OU, EOMI without nystagmus, Hearing grossly intact, left facial droop, Tongue and Uvula midline, Trapezius symmetric bilateral.   Motor: spastic flexion contracture in LUE, no abnormal involuntary or voluntary movements, Fine finger movements diminished in left hand, left pronator drift.   Sensory: unable to assess.  Reflexes: unable to assess.   Cerebellar: RAHM wnl b/l.  Gait: spastic left hip circumduction.     Assessment:     This is 54 y.o. 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     Seizure education provided: including family planning and teratogenicity of AEDs, risk of uncontrolled seizure disorder, SUDEP. No driving until seizure free for six months ([place] state law). No swimming, climbing heights, or operate heavy machinery without supervision. Patient is advised to avoid Benadryl, Tramadol, Wellbutrin, flashing lights, alcohol, sleep deprivation, and certain anti-biotics that can lower seizure threshold.     I have explained the treatment plan, diagnosis, and prognosis to patient. All questions are answered to the best of my knowledge.     Face to face time 40 minutes, including documentation, chart review, counseling, education, review of test results, relevant medical records, and coordination of care.   I have explained the treatment plan, diagnosis, and prognosis to patient. All questions are answered to the best of my knowledge.         Niurka Calles MD   General Neurology  02/17/2025

## 2025-02-17 ENCOUNTER — OFFICE VISIT (OUTPATIENT)
Dept: NEUROLOGY | Facility: CLINIC | Age: 55
End: 2025-02-17
Payer: MEDICAID

## 2025-02-17 DIAGNOSIS — G44.321 INTRACTABLE CHRONIC POST-TRAUMATIC HEADACHE: ICD-10-CM

## 2025-02-17 DIAGNOSIS — I60.7 CEREBRAL ANEURYSM RUPTURE: ICD-10-CM

## 2025-02-17 DIAGNOSIS — G40.209 LOCALIZATION-RELATED SYMPTOMATIC EPILEPSY AND EPILEPTIC SYNDROMES WITH COMPLEX PARTIAL SEIZURES, NOT INTRACTABLE, WITHOUT STATUS EPILEPTICUS: Primary | ICD-10-CM

## 2025-02-17 DIAGNOSIS — Z86.79 HISTORY OF CEREBRAL ANEURYSM: ICD-10-CM

## 2025-02-17 DIAGNOSIS — I69.054: Chronic | ICD-10-CM

## 2025-02-17 RX ORDER — CARBAMAZEPINE 200 MG/1
200 CAPSULE, EXTENDED RELEASE ORAL 2 TIMES DAILY
Qty: 180 CAPSULE | Refills: 1 | Status: SHIPPED | OUTPATIENT
Start: 2025-02-17 | End: 2025-08-16

## 2025-02-17 RX ORDER — ARIPIPRAZOLE 10 MG/1
10 TABLET ORAL
COMMUNITY
Start: 2024-12-24

## 2025-02-17 RX ORDER — GABAPENTIN 300 MG/1
300 CAPSULE ORAL DAILY
Qty: 30 CAPSULE | Refills: 3 | Status: SHIPPED | OUTPATIENT
Start: 2025-02-17 | End: 2025-06-17

## 2025-03-14 ENCOUNTER — HOSPITAL ENCOUNTER (OUTPATIENT)
Dept: RADIOLOGY | Facility: HOSPITAL | Age: 55
Discharge: HOME | End: 2025-03-14
Attending: PSYCHIATRY & NEUROLOGY
Payer: MEDICAID

## 2025-03-14 DIAGNOSIS — G40.209 LOCALIZATION-RELATED SYMPTOMATIC EPILEPSY AND EPILEPTIC SYNDROMES WITH COMPLEX PARTIAL SEIZURES, NOT INTRACTABLE, WITHOUT STATUS EPILEPTICUS: ICD-10-CM

## 2025-03-14 DIAGNOSIS — I60.7 CEREBRAL ANEURYSM RUPTURE: ICD-10-CM

## 2025-03-14 LAB
CREAT SERPL-MCNC: 0.81 MG/DL (ref 0.72–1.25)
GFR SERPLBLD CREATININE-BSD FMLA CKD-EPI: >60 ML/MIN/1.73/M2

## 2025-03-14 PROCEDURE — 70496 CT ANGIOGRAPHY HEAD: CPT | Mod: TC

## 2025-03-14 PROCEDURE — 82565 ASSAY OF CREATININE: CPT | Performed by: PSYCHIATRY & NEUROLOGY

## 2025-03-14 PROCEDURE — 25500020 PHARM REV CODE 255

## 2025-03-14 RX ADMIN — IOHEXOL 75 ML: 350 INJECTION, SOLUTION INTRAVENOUS at 10:03

## 2025-03-17 ENCOUNTER — RESULTS FOLLOW-UP (OUTPATIENT)
Dept: NEUROLOGY | Facility: CLINIC | Age: 55
End: 2025-03-17

## 2025-03-20 ENCOUNTER — RESULTS FOLLOW-UP (OUTPATIENT)
Dept: INTERNAL MEDICINE | Facility: CLINIC | Age: 55
End: 2025-03-20
Payer: MEDICAID

## (undated) DEVICE — COVER CIV-FLEX PROBE US 58IN

## (undated) DEVICE — DRSNG POLYSKIN TRNSPAR 4X4.75

## (undated) DEVICE — OMNIPAQUE 350MG 150ML VIAL

## (undated) DEVICE — Device

## (undated) DEVICE — NDL BLUNT FILL 18G 1IN

## (undated) DEVICE — INTRODUCER CATH 5F 11CM

## (undated) DEVICE — PAD DEFIB CADENCE ADULT R2

## (undated) DEVICE — GUIDEWIRE EMERALD 3MM 175X5CM

## (undated) DEVICE — CATH JL4 5FR

## (undated) DEVICE — KIT INTRO MICPUNC STF 5FRX10CM

## (undated) DEVICE — CATH JR4 5FR

## (undated) DEVICE — DEVICE MYNX CONTROL 5FR 10ML